# Patient Record
Sex: MALE | Race: WHITE | NOT HISPANIC OR LATINO
[De-identification: names, ages, dates, MRNs, and addresses within clinical notes are randomized per-mention and may not be internally consistent; named-entity substitution may affect disease eponyms.]

---

## 2017-09-01 ENCOUNTER — APPOINTMENT (OUTPATIENT)
Dept: NEPHROLOGY | Facility: CLINIC | Age: 82
End: 2017-09-01
Payer: MEDICARE

## 2017-09-01 VITALS — SYSTOLIC BLOOD PRESSURE: 94 MMHG | HEART RATE: 74 BPM | DIASTOLIC BLOOD PRESSURE: 64 MMHG

## 2017-09-01 DIAGNOSIS — Z87.440 PERSONAL HISTORY OF URINARY (TRACT) INFECTIONS: ICD-10-CM

## 2017-09-01 DIAGNOSIS — I95.9 HYPOTENSION, UNSPECIFIED: ICD-10-CM

## 2017-09-01 DIAGNOSIS — Z78.9 OTHER SPECIFIED HEALTH STATUS: ICD-10-CM

## 2017-09-01 PROCEDURE — 99215 OFFICE O/P EST HI 40 MIN: CPT

## 2017-09-01 RX ORDER — NITROGLYCERIN 0.4 MG/1
0.4 TABLET SUBLINGUAL
Qty: 25 | Refills: 0 | Status: ACTIVE | COMMUNITY
Start: 2017-06-22

## 2017-09-01 RX ORDER — SULFAMETHOXAZOLE AND TRIMETHOPRIM 800; 160 MG/1; MG/1
800-160 TABLET ORAL
Qty: 20 | Refills: 0 | Status: DISCONTINUED | COMMUNITY
Start: 2017-06-30

## 2017-09-01 RX ORDER — AMOXICILLIN AND CLAVULANATE POTASSIUM 875; 125 MG/1; MG/1
875-125 TABLET, COATED ORAL
Qty: 20 | Refills: 0 | Status: DISCONTINUED | COMMUNITY
Start: 2017-06-14

## 2017-09-05 LAB
APPEARANCE: CLEAR
BACTERIA: NEGATIVE
BILIRUBIN URINE: NEGATIVE
BLOOD URINE: NEGATIVE
COLOR: YELLOW
CREAT SPEC-SCNC: 33 MG/DL
CREAT/PROT UR: 0.2 RATIO
GLUCOSE QUALITATIVE U: NORMAL MG/DL
KETONES URINE: NEGATIVE
LEUKOCYTE ESTERASE URINE: NEGATIVE
MICROSCOPIC-UA: NORMAL
NITRITE URINE: NEGATIVE
PH URINE: 6
PROT UR-MCNC: 7 MG/DL
PROTEIN URINE: NEGATIVE MG/DL
RED BLOOD CELLS URINE: 1 /HPF
SPECIFIC GRAVITY URINE: 1.01
SQUAMOUS EPITHELIAL CELLS: 0 /HPF
UROBILINOGEN URINE: NORMAL MG/DL
WHITE BLOOD CELLS URINE: 0 /HPF

## 2017-12-15 ENCOUNTER — OUTPATIENT (OUTPATIENT)
Dept: OUTPATIENT SERVICES | Facility: HOSPITAL | Age: 82
LOS: 1 days | End: 2017-12-15
Payer: MEDICARE

## 2017-12-15 DIAGNOSIS — I25.10 ATHEROSCLEROTIC HEART DISEASE OF NATIVE CORONARY ARTERY WITHOUT ANGINA PECTORIS: ICD-10-CM

## 2017-12-15 PROCEDURE — 93010 ELECTROCARDIOGRAM REPORT: CPT

## 2017-12-15 PROCEDURE — 93005 ELECTROCARDIOGRAM TRACING: CPT

## 2017-12-28 ENCOUNTER — OUTPATIENT (OUTPATIENT)
Dept: OUTPATIENT SERVICES | Facility: HOSPITAL | Age: 82
LOS: 1 days | End: 2017-12-28
Payer: MEDICARE

## 2017-12-28 DIAGNOSIS — I25.10 ATHEROSCLEROTIC HEART DISEASE OF NATIVE CORONARY ARTERY WITHOUT ANGINA PECTORIS: ICD-10-CM

## 2017-12-28 PROCEDURE — 93306 TTE W/DOPPLER COMPLETE: CPT | Mod: 26

## 2017-12-28 PROCEDURE — 93306 TTE W/DOPPLER COMPLETE: CPT

## 2018-07-27 PROBLEM — Z78.9 ALCOHOL USE: Status: ACTIVE | Noted: 2017-09-01

## 2018-09-26 ENCOUNTER — OUTPATIENT (OUTPATIENT)
Dept: OUTPATIENT SERVICES | Facility: HOSPITAL | Age: 83
LOS: 1 days | End: 2018-09-26
Payer: MEDICARE

## 2018-09-26 DIAGNOSIS — I25.10 ATHEROSCLEROTIC HEART DISEASE OF NATIVE CORONARY ARTERY WITHOUT ANGINA PECTORIS: ICD-10-CM

## 2018-09-26 PROCEDURE — 93306 TTE W/DOPPLER COMPLETE: CPT | Mod: 26

## 2018-09-26 PROCEDURE — 93227 XTRNL ECG REC<48 HR R&I: CPT

## 2018-09-26 PROCEDURE — 93010 ELECTROCARDIOGRAM REPORT: CPT

## 2018-10-01 PROCEDURE — 93225 XTRNL ECG REC<48 HRS REC: CPT

## 2018-10-01 PROCEDURE — 93005 ELECTROCARDIOGRAM TRACING: CPT

## 2018-10-01 PROCEDURE — 93306 TTE W/DOPPLER COMPLETE: CPT

## 2019-04-16 ENCOUNTER — APPOINTMENT (OUTPATIENT)
Dept: NEPHROLOGY | Facility: CLINIC | Age: 84
End: 2019-04-16
Payer: MEDICARE

## 2019-04-16 VITALS — HEART RATE: 62 BPM | SYSTOLIC BLOOD PRESSURE: 136 MMHG | DIASTOLIC BLOOD PRESSURE: 70 MMHG

## 2019-04-16 PROCEDURE — 99214 OFFICE O/P EST MOD 30 MIN: CPT

## 2019-04-16 RX ORDER — CEPHALEXIN 500 MG/1
500 CAPSULE ORAL
Qty: 20 | Refills: 0 | Status: DISCONTINUED | COMMUNITY
Start: 2019-03-25

## 2019-04-16 RX ORDER — CEFUROXIME AXETIL 250 MG/1
250 TABLET ORAL
Qty: 20 | Refills: 0 | Status: DISCONTINUED | COMMUNITY
Start: 2018-12-13

## 2019-04-16 NOTE — PHYSICAL EXAM
[General Appearance - Alert] : alert [General Appearance - In No Acute Distress] : in no acute distress [Sclera] : the sclera and conjunctiva were normal [Extraocular Movements] : extraocular movements were intact [Examination Of The Oral Cavity] : the lips and gums were normal [Outer Ear] : the ears and nose were normal in appearance [Neck Appearance] : the appearance of the neck was normal [Jugular Venous Distention Increased] : there was no jugular-venous distention [Neck Cervical Mass (___cm)] : no neck mass was observed [Auscultation Breath Sounds / Voice Sounds] : lungs were clear to auscultation bilaterally [Heart Sounds] : normal S1 and S2 [Heart Rate And Rhythm] : heart rate was normal and rhythm regular [Heart Sounds Pericardial Friction Rub] : no pericardial rub [Murmurs] : no murmurs [Heart Sounds Gallop] : no gallops [Edema] : there was no peripheral edema [Abdomen Soft] : soft [Bowel Sounds] : normal bowel sounds [Abdomen Tenderness] : non-tender [Cervical Lymph Nodes Enlarged Anterior Bilaterally] : anterior cervical [Abdomen Mass (___ Cm)] : no abdominal mass palpated [Supraclavicular Lymph Nodes Enlarged Bilaterally] : supraclavicular [No CVA Tenderness] : no ~M costovertebral angle tenderness [No Spinal Tenderness] : no spinal tenderness [Abnormal Walk] : normal gait [No Focal Deficits] : no focal deficits [] : no rash [Impaired Insight] : insight and judgment were intact [Oriented To Time, Place, And Person] : oriented to person, place, and time [Affect] : the affect was normal

## 2019-04-17 LAB
APPEARANCE: CLEAR
BACTERIA: NEGATIVE
BILIRUBIN URINE: NEGATIVE
BLOOD URINE: NEGATIVE
COLOR: NORMAL
CREAT SPEC-SCNC: 67 MG/DL
CREAT/PROT UR: 0.2 RATIO
GLUCOSE QUALITATIVE U: NEGATIVE
HYALINE CASTS: 1 /LPF
KETONES URINE: NEGATIVE
LEUKOCYTE ESTERASE URINE: NEGATIVE
MICROSCOPIC-UA: NORMAL
NITRITE URINE: NEGATIVE
PH URINE: 6
PROT UR-MCNC: 14 MG/DL
PROTEIN URINE: NEGATIVE
RED BLOOD CELLS URINE: 1 /HPF
SPECIFIC GRAVITY URINE: 1.01
SQUAMOUS EPITHELIAL CELLS: 0 /HPF
UROBILINOGEN URINE: NORMAL
WHITE BLOOD CELLS URINE: 0 /HPF

## 2019-04-18 ENCOUNTER — TRANSCRIPTION ENCOUNTER (OUTPATIENT)
Age: 84
End: 2019-04-18

## 2019-04-19 LAB — BACTERIA UR CULT: NORMAL

## 2019-04-19 RX ORDER — METOPROLOL TARTRATE 25 MG/1
25 TABLET, FILM COATED ORAL
Qty: 180 | Refills: 0 | Status: DISCONTINUED | COMMUNITY
Start: 2017-08-13 | End: 2019-04-19

## 2019-04-19 NOTE — HISTORY OF PRESENT ILLNESS
[FreeTextEntry1] : 86 yo man  here for f/u evaluation of CKD 3, CAD, recurrent UTI last seen in office 9/2017\par Developed low back pain several weeks ago, while in [place] visiting his brother for last 3 months. No hematuria, dysuria or frothy urine.\par using medication for BPH- otherwise  no changes\par  No NSAID use.\par Denies flank pain dysuria or hematuria- \par \par

## 2019-04-19 NOTE — ASSESSMENT
[FreeTextEntry1] : all lab data was reviewed with patient in detail from 1/10/2019\par 86 yo man with CKD 3, proteinuria, hyperuricemia,CAD,  glucose intolerance and hyperuricemia.\par Also recently hospitalized for recurrent UTI\par --CKD 3-  creat stable range at 1.3; electrolytes good\par -hypotension- BP stabilized- on b-blocker for CAD- \par -hyperuricemia-  stable- c/w allopurinol- \par .-proteinuria-- normalized at .2 mg/g creat over last 2 determinations\par \par f/u 4 -6 months\par \par

## 2019-06-19 ENCOUNTER — OUTPATIENT (OUTPATIENT)
Dept: OUTPATIENT SERVICES | Facility: HOSPITAL | Age: 84
LOS: 1 days | End: 2019-06-19
Payer: MEDICARE

## 2019-06-19 ENCOUNTER — APPOINTMENT (OUTPATIENT)
Dept: INTERVENTIONAL RADIOLOGY/VASCULAR | Facility: HOSPITAL | Age: 84
End: 2019-06-19
Payer: MEDICARE

## 2019-06-19 PROCEDURE — 64483 NJX AA&/STRD TFRM EPI L/S 1: CPT

## 2019-06-19 PROCEDURE — 64483 NJX AA&/STRD TFRM EPI L/S 1: CPT | Mod: RT

## 2019-07-26 ENCOUNTER — OUTPATIENT (OUTPATIENT)
Dept: OUTPATIENT SERVICES | Facility: HOSPITAL | Age: 84
LOS: 1 days | End: 2019-07-26
Payer: MEDICARE

## 2019-07-26 ENCOUNTER — APPOINTMENT (OUTPATIENT)
Dept: INTERVENTIONAL RADIOLOGY/VASCULAR | Facility: HOSPITAL | Age: 84
End: 2019-07-26
Payer: MEDICARE

## 2019-07-26 PROCEDURE — 62323 NJX INTERLAMINAR LMBR/SAC: CPT

## 2019-08-27 ENCOUNTER — APPOINTMENT (OUTPATIENT)
Dept: NEPHROLOGY | Facility: CLINIC | Age: 84
End: 2019-08-27
Payer: MEDICARE

## 2019-08-27 VITALS — DIASTOLIC BLOOD PRESSURE: 68 MMHG | SYSTOLIC BLOOD PRESSURE: 121 MMHG | HEART RATE: 65 BPM

## 2019-08-27 PROCEDURE — 99214 OFFICE O/P EST MOD 30 MIN: CPT

## 2019-08-27 RX ORDER — METOPROLOL SUCCINATE 50 MG/1
50 TABLET, EXTENDED RELEASE ORAL
Qty: 90 | Refills: 0 | Status: DISCONTINUED | COMMUNITY
Start: 2019-02-19 | End: 2019-08-27

## 2019-08-27 NOTE — PHYSICAL EXAM
[General Appearance - Alert] : alert [Sclera] : the sclera and conjunctiva were normal [General Appearance - In No Acute Distress] : in no acute distress [Outer Ear] : the ears and nose were normal in appearance [Extraocular Movements] : extraocular movements were intact [Examination Of The Oral Cavity] : the lips and gums were normal [Neck Appearance] : the appearance of the neck was normal [Neck Cervical Mass (___cm)] : no neck mass was observed [Jugular Venous Distention Increased] : there was no jugular-venous distention [Auscultation Breath Sounds / Voice Sounds] : lungs were clear to auscultation bilaterally [Heart Sounds] : normal S1 and S2 [Heart Rate And Rhythm] : heart rate was normal and rhythm regular [Murmurs] : no murmurs [Heart Sounds Gallop] : no gallops [Heart Sounds Pericardial Friction Rub] : no pericardial rub [Edema] : there was no peripheral edema [Abdomen Mass (___ Cm)] : no abdominal mass palpated [Cervical Lymph Nodes Enlarged Anterior Bilaterally] : anterior cervical [Supraclavicular Lymph Nodes Enlarged Bilaterally] : supraclavicular [No CVA Tenderness] : no ~M costovertebral angle tenderness [No Spinal Tenderness] : no spinal tenderness [Abnormal Walk] : normal gait [No Focal Deficits] : no focal deficits [] : no rash [Affect] : the affect was normal [Oriented To Time, Place, And Person] : oriented to person, place, and time [Impaired Insight] : insight and judgment were intact

## 2019-08-27 NOTE — ASSESSMENT
[FreeTextEntry1] : all lab data was reviewed with patient in detail from 4/16/2019\par 86 yo man with CKD 3, proteinuria, hyperuricemia,CAD,  glucose intolerance and hyperuricemia.\par --CKD 3-  creat stable range; electrolytes good; given his above average muscle mass with measure cystatin-c for another means of estimating GFR\par wife asking about need for dietary limitations- does not need to modify diet at this tme- self restricting\par -hypotension- resolved  BP in good range; continues on b-blocker for CAD- \par -hyperuricemia-  stable-  no episodes of acute gout- c/w allopurinol- \par .-proteinuria-- normalized with prior determinations- for repeat data today\par \par f/u 4 -6 months\par \par

## 2019-08-27 NOTE — HISTORY OF PRESENT ILLNESS
[FreeTextEntry1] : 84 yo man with CKD 3, CAD, recurrent UTIs here for f/u evaluation.\par LBP improved a bit s/p 2 steroid injections last winter and again in spring- may go for a 3rd injection next month.\par No other new interim medical events\par No hematuria, dysuria or frothy urine.\par No NSAID use.\par Denies flank pain dysuria or hematuria- \par no recurrent CP; No SOB\par \par

## 2019-08-29 LAB
ALBUMIN SERPL ELPH-MCNC: 3.8 G/DL
ANION GAP SERPL CALC-SCNC: 12 MMOL/L
APPEARANCE: CLEAR
BACTERIA: NEGATIVE
BILIRUBIN URINE: NEGATIVE
BLOOD URINE: ABNORMAL
BUN SERPL-MCNC: 27 MG/DL
CALCIUM SERPL-MCNC: 9.1 MG/DL
CHLORIDE SERPL-SCNC: 104 MMOL/L
CO2 SERPL-SCNC: 25 MMOL/L
COLOR: NORMAL
CREAT SERPL-MCNC: 1.4 MG/DL
CREAT SPEC-SCNC: 85 MG/DL
CREAT/PROT UR: 0.1 RATIO
CYSTATIN C SERPL-MCNC: 1.44 MG/L
GFR/BSA.PRED SERPLBLD CYS-BASED-ARV: 43 ML/MIN
GLUCOSE QUALITATIVE U: NEGATIVE
GLUCOSE SERPL-MCNC: 160 MG/DL
HYALINE CASTS: 0 /LPF
KETONES URINE: NEGATIVE
LEUKOCYTE ESTERASE URINE: NEGATIVE
MICROSCOPIC-UA: NORMAL
NITRITE URINE: NEGATIVE
PH URINE: 6
PHOSPHATE SERPL-MCNC: 2.7 MG/DL
POTASSIUM SERPL-SCNC: 4.3 MMOL/L
PROT UR-MCNC: 8 MG/DL
PROTEIN URINE: NEGATIVE
RED BLOOD CELLS URINE: 24 /HPF
SODIUM SERPL-SCNC: 141 MMOL/L
SPECIFIC GRAVITY URINE: 1.01
SQUAMOUS EPITHELIAL CELLS: 0 /HPF
UROBILINOGEN URINE: NORMAL
WHITE BLOOD CELLS URINE: 1 /HPF

## 2019-09-17 ENCOUNTER — OUTPATIENT (OUTPATIENT)
Dept: OUTPATIENT SERVICES | Facility: HOSPITAL | Age: 84
LOS: 1 days | End: 2019-09-17
Payer: MEDICARE

## 2019-09-17 ENCOUNTER — APPOINTMENT (OUTPATIENT)
Dept: INTERVENTIONAL RADIOLOGY/VASCULAR | Facility: HOSPITAL | Age: 84
End: 2019-09-17
Payer: MEDICARE

## 2019-09-17 PROCEDURE — 62323 NJX INTERLAMINAR LMBR/SAC: CPT

## 2019-11-19 ENCOUNTER — EMERGENCY (EMERGENCY)
Facility: HOSPITAL | Age: 84
LOS: 1 days | Discharge: HOME | End: 2019-11-19
Attending: EMERGENCY MEDICINE | Admitting: EMERGENCY MEDICINE
Payer: MEDICARE

## 2019-11-19 VITALS
HEART RATE: 54 BPM | DIASTOLIC BLOOD PRESSURE: 70 MMHG | OXYGEN SATURATION: 95 % | TEMPERATURE: 98 F | SYSTOLIC BLOOD PRESSURE: 115 MMHG | RESPIRATION RATE: 16 BRPM

## 2019-11-19 VITALS
RESPIRATION RATE: 18 BRPM | HEART RATE: 86 BPM | DIASTOLIC BLOOD PRESSURE: 67 MMHG | TEMPERATURE: 98 F | SYSTOLIC BLOOD PRESSURE: 135 MMHG | WEIGHT: 154.98 LBS | HEIGHT: 66 IN | OXYGEN SATURATION: 97 %

## 2019-11-19 DIAGNOSIS — I10 ESSENTIAL (PRIMARY) HYPERTENSION: ICD-10-CM

## 2019-11-19 DIAGNOSIS — R07.89 OTHER CHEST PAIN: ICD-10-CM

## 2019-11-19 DIAGNOSIS — Z79.899 OTHER LONG TERM (CURRENT) DRUG THERAPY: ICD-10-CM

## 2019-11-19 LAB
ALBUMIN SERPL ELPH-MCNC: 4.1 G/DL — SIGNIFICANT CHANGE UP (ref 3.3–5)
ALP SERPL-CCNC: 68 U/L — SIGNIFICANT CHANGE UP (ref 40–120)
ALT FLD-CCNC: 34 U/L — SIGNIFICANT CHANGE UP (ref 10–45)
ANION GAP SERPL CALC-SCNC: 10 MMOL/L — SIGNIFICANT CHANGE UP (ref 5–17)
APTT BLD: 27.7 SEC — SIGNIFICANT CHANGE UP (ref 27.5–36.3)
AST SERPL-CCNC: 37 U/L — SIGNIFICANT CHANGE UP (ref 10–40)
BASOPHILS # BLD AUTO: 0.04 K/UL — SIGNIFICANT CHANGE UP (ref 0–0.2)
BASOPHILS NFR BLD AUTO: 0.6 % — SIGNIFICANT CHANGE UP (ref 0–2)
BILIRUB SERPL-MCNC: 0.5 MG/DL — SIGNIFICANT CHANGE UP (ref 0.2–1.2)
BUN SERPL-MCNC: 26 MG/DL — HIGH (ref 7–23)
CALCIUM SERPL-MCNC: 9.6 MG/DL — SIGNIFICANT CHANGE UP (ref 8.4–10.5)
CHLORIDE SERPL-SCNC: 105 MMOL/L — SIGNIFICANT CHANGE UP (ref 96–108)
CK MB CFR SERPL CALC: 2.8 NG/ML — SIGNIFICANT CHANGE UP (ref 0–6.7)
CO2 SERPL-SCNC: 26 MMOL/L — SIGNIFICANT CHANGE UP (ref 22–31)
CREAT SERPL-MCNC: 1.22 MG/DL — SIGNIFICANT CHANGE UP (ref 0.5–1.3)
EOSINOPHIL # BLD AUTO: 0.22 K/UL — SIGNIFICANT CHANGE UP (ref 0–0.5)
EOSINOPHIL NFR BLD AUTO: 3.3 % — SIGNIFICANT CHANGE UP (ref 0–6)
GLUCOSE SERPL-MCNC: 170 MG/DL — HIGH (ref 70–99)
HCT VFR BLD CALC: 41.7 % — SIGNIFICANT CHANGE UP (ref 39–50)
HGB BLD-MCNC: 13.4 G/DL — SIGNIFICANT CHANGE UP (ref 13–17)
IMM GRANULOCYTES NFR BLD AUTO: 0.1 % — SIGNIFICANT CHANGE UP (ref 0–1.5)
INR BLD: 1.05 — SIGNIFICANT CHANGE UP (ref 0.88–1.16)
LYMPHOCYTES # BLD AUTO: 1.08 K/UL — SIGNIFICANT CHANGE UP (ref 1–3.3)
LYMPHOCYTES # BLD AUTO: 16 % — SIGNIFICANT CHANGE UP (ref 13–44)
MCHC RBC-ENTMCNC: 30.3 PG — SIGNIFICANT CHANGE UP (ref 27–34)
MCHC RBC-ENTMCNC: 32.1 GM/DL — SIGNIFICANT CHANGE UP (ref 32–36)
MCV RBC AUTO: 94.3 FL — SIGNIFICANT CHANGE UP (ref 80–100)
MONOCYTES # BLD AUTO: 0.48 K/UL — SIGNIFICANT CHANGE UP (ref 0–0.9)
MONOCYTES NFR BLD AUTO: 7.1 % — SIGNIFICANT CHANGE UP (ref 2–14)
NEUTROPHILS # BLD AUTO: 4.9 K/UL — SIGNIFICANT CHANGE UP (ref 1.8–7.4)
NEUTROPHILS NFR BLD AUTO: 72.9 % — SIGNIFICANT CHANGE UP (ref 43–77)
NRBC # BLD: 0 /100 WBCS — SIGNIFICANT CHANGE UP (ref 0–0)
PLATELET # BLD AUTO: 167 K/UL — SIGNIFICANT CHANGE UP (ref 150–400)
POTASSIUM SERPL-MCNC: 4.3 MMOL/L — SIGNIFICANT CHANGE UP (ref 3.5–5.3)
POTASSIUM SERPL-SCNC: 4.3 MMOL/L — SIGNIFICANT CHANGE UP (ref 3.5–5.3)
PROT SERPL-MCNC: 6.5 G/DL — SIGNIFICANT CHANGE UP (ref 6–8.3)
PROTHROM AB SERPL-ACNC: 11.9 SEC — SIGNIFICANT CHANGE UP (ref 10–12.9)
RBC # BLD: 4.42 M/UL — SIGNIFICANT CHANGE UP (ref 4.2–5.8)
RBC # FLD: 13.1 % — SIGNIFICANT CHANGE UP (ref 10.3–14.5)
SODIUM SERPL-SCNC: 141 MMOL/L — SIGNIFICANT CHANGE UP (ref 135–145)
TROPONIN T SERPL-MCNC: 0.01 NG/ML — SIGNIFICANT CHANGE UP (ref 0–0.01)
TROPONIN T SERPL-MCNC: <0.01 NG/ML — SIGNIFICANT CHANGE UP (ref 0–0.01)
WBC # BLD: 6.73 K/UL — SIGNIFICANT CHANGE UP (ref 3.8–10.5)
WBC # FLD AUTO: 6.73 K/UL — SIGNIFICANT CHANGE UP (ref 3.8–10.5)

## 2019-11-19 PROCEDURE — 80053 COMPREHEN METABOLIC PANEL: CPT

## 2019-11-19 PROCEDURE — 85730 THROMBOPLASTIN TIME PARTIAL: CPT

## 2019-11-19 PROCEDURE — 36415 COLL VENOUS BLD VENIPUNCTURE: CPT

## 2019-11-19 PROCEDURE — 85025 COMPLETE CBC W/AUTO DIFF WBC: CPT

## 2019-11-19 PROCEDURE — 93005 ELECTROCARDIOGRAM TRACING: CPT

## 2019-11-19 PROCEDURE — 82553 CREATINE MB FRACTION: CPT

## 2019-11-19 PROCEDURE — 71045 X-RAY EXAM CHEST 1 VIEW: CPT

## 2019-11-19 PROCEDURE — 82550 ASSAY OF CK (CPK): CPT

## 2019-11-19 PROCEDURE — 93010 ELECTROCARDIOGRAM REPORT: CPT

## 2019-11-19 PROCEDURE — 84484 ASSAY OF TROPONIN QUANT: CPT

## 2019-11-19 PROCEDURE — 85610 PROTHROMBIN TIME: CPT

## 2019-11-19 PROCEDURE — 99283 EMERGENCY DEPT VISIT LOW MDM: CPT | Mod: 25

## 2019-11-19 PROCEDURE — 99285 EMERGENCY DEPT VISIT HI MDM: CPT

## 2019-11-19 PROCEDURE — 71045 X-RAY EXAM CHEST 1 VIEW: CPT | Mod: 26

## 2019-11-19 NOTE — ED PROVIDER NOTE - PATIENT PORTAL LINK FT
You can access the FollowMyHealth Patient Portal offered by [facility] by registering at the following website: [email or web address]. By joining [facility]’s FollowMyHealth portal, you will also be able to view your health information using other applications (apps) compatible with our system.

## 2019-11-19 NOTE — ED ADULT NURSE NOTE - PMH
Acute myocardial infarction  Myocardial infarction  Atherosclerosis of coronary artery  CAD (coronary artery disease)  Benign prostatic hypertrophy without lower urinary tract symptoms  BPH (benign prostatic hyperplasia)  Calculus of kidney  Kidney stone  Essential hypertension  HTN (hypertension)

## 2019-11-19 NOTE — ED ADULT TRIAGE NOTE - CHIEF COMPLAINT QUOTE
pt c/o intermittent left sided chest pain, radiating to the shoulder since Thursday, denies SOB or pain at this time. ekg done.

## 2019-11-19 NOTE — ED ADULT NURSE NOTE - OBJECTIVE STATEMENT
Patient presents to the ED with intermittent left sided chest pain radiating to the left shoulder for about a week.  denies diaphoresis, SOB.  AA&OX3, respirations unlabored, non-diaphoretic, no pallor.  Well appearing.

## 2019-11-19 NOTE — ED PROVIDER NOTE - OBJECTIVE STATEMENT
84 y/o male with hx of CAD, HTH, BPH c/o cp x 3 days. pt states intermittent pain for past 3 days to left chest and radiates to left arm. no sob or FLORES. pt notes pain worse last night. pt reports short episode of discomfort this am. no ha or dizziness. no abd pain, n/v. no neck or back pain. no leg pain or swelling. no further complaints.

## 2019-11-19 NOTE — ED PROVIDER NOTE - NS ED MD EM SELECTION
Injectable Influenza Immunization Documentation    1.  Is the person to be vaccinated sick today?  No    2. Does the person to be vaccinated have an allergy to eggs or to a component of the vaccine?  No    3. Has the person to be vaccinated today ever had a serious reaction to influenza vaccine in the past?  No    4. Has the person to be vaccinated ever had Guillain-Newry syndrome?  No     Form completed by Courtney Balderrama CMA - Pediatrics    Prior to injection verified patient identity using patient's name and date of birth. Patient instructed to remain in clinic for 20 minutes afterwards, and to report any adverse reaction to me immediately.    Courtney Balderrama CMA - Pediatrics       [id number] [facility]

## 2019-11-19 NOTE — ED ADULT TRIAGE NOTE - WEIGHT IN KG
"Received call from patients mom Valentino, stating that patient stopped taking her losartan 2 weeks ago and her BP has been \"wonderful\". -120/60-80. Valentino reports that she cancelled Paulo's appointment with Dr. Alexis for next Wednesday. Writer tried to call back, left VM. Will try again on Monday.  Christal Morales LPN  Nephrology  [phone]    "
Spoke to patient, who reports that she is feeling good, BP good off the losartan and they cancelled appointment for this week with Dr. Baig.  Offered to discuss with patients mom if she wants and encouraged patient and or mom to call with any questions or concerns in the future.   Christal Morales LPN  Nephrology  [phone]    
70.3

## 2019-11-19 NOTE — ED PROVIDER NOTE - PROGRESS NOTE DETAILS
Shannan: received s/o pending repeat trop. repeat trop wnl, feeling much better, comfortable for dc, outpt f/u.

## 2019-11-19 NOTE — ED PROVIDER NOTE - CARE PROVIDER_API CALL
Marlon Nice (MD)  Cardiovascular Disease  [address] [place] [address], [address] [place] [address]  Phone: [phone]  Fax: [phone]  Follow Up Time: 1-3 Days

## 2019-11-19 NOTE — ED PROVIDER NOTE - ATTENDING CONTRIBUTION TO CARE
85M cad, htn, bph, c/o 3d intermittent sharp L cp radiating to L armpit. no fever/chills, no dizziness, no uri/cough, no sob/wheezing, no abd pain/n/v, no leg pain/swelling/rash, no rash, no trauma. avss. nontoxic. NAD. no active cp. no acute resp distress. no leukocytosis vs sig anemia vs electrolyte abnl. trop neg x1, trop#2 pending. ekg w/o acute abnl. heart score 3. cxr w/o acute focal consol vs ptx vs pulm edema. s/o'd to dr dietz pending trop#2.    I saw and discussed the care of the pt directly with the ACP while the pt was in the ED. i have reviewed the ACP note and agree w/ the history, exam and plan of care other than as noted above.

## 2020-01-21 ENCOUNTER — APPOINTMENT (OUTPATIENT)
Dept: NEPHROLOGY | Facility: CLINIC | Age: 85
End: 2020-01-21

## 2020-10-07 ENCOUNTER — APPOINTMENT (OUTPATIENT)
Dept: OTOLARYNGOLOGY | Facility: CLINIC | Age: 85
End: 2020-10-07
Payer: MEDICARE

## 2020-10-07 VITALS — TEMPERATURE: 98.7 F | WEIGHT: 157 LBS | BODY MASS INDEX: 25.23 KG/M2 | HEIGHT: 66 IN

## 2020-10-07 DIAGNOSIS — Z78.9 OTHER SPECIFIED HEALTH STATUS: ICD-10-CM

## 2020-10-07 DIAGNOSIS — Z82.5 FAMILY HISTORY OF ASTHMA AND OTHER CHRONIC LOWER RESPIRATORY DISEASES: ICD-10-CM

## 2020-10-07 DIAGNOSIS — J31.0 CHRONIC RHINITIS: ICD-10-CM

## 2020-10-07 DIAGNOSIS — Z83.3 FAMILY HISTORY OF DIABETES MELLITUS: ICD-10-CM

## 2020-10-07 PROCEDURE — 99213 OFFICE O/P EST LOW 20 MIN: CPT | Mod: 25

## 2020-10-07 PROCEDURE — 69210 REMOVE IMPACTED EAR WAX UNI: CPT

## 2020-10-07 NOTE — ASSESSMENT
[FreeTextEntry1] : AMBREEN BRITTANEY is doing well. i suggested holding off with Flonase and using Vaseline on caudal septum in am.

## 2020-10-07 NOTE — HISTORY OF PRESENT ILLNESS
[de-identified] : AMBREEN QUISPE is a 86 year man with a history of CAD who complains of dry nose. he uses Flonase periodically.

## 2020-11-12 ENCOUNTER — APPOINTMENT (OUTPATIENT)
Dept: NEPHROLOGY | Facility: CLINIC | Age: 85
End: 2020-11-12
Payer: MEDICARE

## 2020-11-12 DIAGNOSIS — Z23 ENCOUNTER FOR IMMUNIZATION: ICD-10-CM

## 2020-11-12 PROCEDURE — 99214 OFFICE O/P EST MOD 30 MIN: CPT | Mod: 25

## 2020-11-12 PROCEDURE — G0008: CPT

## 2020-11-12 PROCEDURE — 90662 IIV NO PRSV INCREASED AG IM: CPT | Mod: 59

## 2020-11-12 NOTE — ASSESSMENT
[FreeTextEntry1] : all lab data was reviewed with patient in detail from 11/5/2020\par 87 yo man with CKD 3, proteinuria, hyperuricemia,CAD,  glucose intolerance and hyperuricemia.\par --CKD 3-  BUN/creat good 37/1.24; electrolytes WNL; limiting protein and avoiding NSAIDs\par -glucose intolerance-  glucose 129- A1C 6.8%- counseled on need to reduce CHO intake, make an effort to walk at least 3 x daily\par and lose weight. May need to start antihyperglycemic meds if no improvement by next OV\par -hyperuricemia-  UA very good- 4.5- no episodes of acute gout- c/w allopurinol- \par .-proteinuria-- low grade  286-  No RAAS at this time as BP on low side and needs BB for cardiac hx. \par -flu shot today\par \par f/u 6 months\par \par

## 2020-11-12 NOTE — HISTORY OF PRESENT ILLNESS
[FreeTextEntry1] : 87 yo man here for f/u evaluation of CKD 3, CAD, recurrent UTIs.\par coping with COVID restrictions. No new medical events and no change to meds\par LBP stable- does feel that he is stiff every morning and after sitting for a while has pain\par No hematuria, dysuria or frothy urine.\par No NSAID use.\par Denies flank pain dysuria or hematuria- \par no recurrent CP; No SOB\par \par

## 2020-11-12 NOTE — PHYSICAL EXAM
[General Appearance - Alert] : alert [General Appearance - In No Acute Distress] : in no acute distress [Sclera] : the sclera and conjunctiva were normal [Extraocular Movements] : extraocular movements were intact [Outer Ear] : the ears and nose were normal in appearance [Examination Of The Oral Cavity] : the lips and gums were normal [Neck Appearance] : the appearance of the neck was normal [Neck Cervical Mass (___cm)] : no neck mass was observed [Jugular Venous Distention Increased] : there was no jugular-venous distention [Auscultation Breath Sounds / Voice Sounds] : lungs were clear to auscultation bilaterally [Heart Rate And Rhythm] : heart rate was normal and rhythm regular [Heart Sounds] : normal S1 and S2 [Heart Sounds Gallop] : no gallops [Murmurs] : no murmurs [Heart Sounds Pericardial Friction Rub] : no pericardial rub [Edema] : there was no peripheral edema [Cervical Lymph Nodes Enlarged Anterior Bilaterally] : anterior cervical [Supraclavicular Lymph Nodes Enlarged Bilaterally] : supraclavicular [No CVA Tenderness] : no ~M costovertebral angle tenderness [No Spinal Tenderness] : no spinal tenderness [Abnormal Walk] : normal gait [] : no rash [No Focal Deficits] : no focal deficits [Oriented To Time, Place, And Person] : oriented to person, place, and time [Impaired Insight] : insight and judgment were intact [Affect] : the affect was normal

## 2020-12-03 ENCOUNTER — OUTPATIENT (OUTPATIENT)
Dept: OUTPATIENT SERVICES | Facility: HOSPITAL | Age: 85
LOS: 1 days | End: 2020-12-03
Payer: MEDICARE

## 2020-12-03 DIAGNOSIS — I25.9 CHRONIC ISCHEMIC HEART DISEASE, UNSPECIFIED: ICD-10-CM

## 2020-12-03 PROCEDURE — 93010 ELECTROCARDIOGRAM REPORT: CPT

## 2020-12-03 PROCEDURE — 93005 ELECTROCARDIOGRAM TRACING: CPT

## 2020-12-03 PROCEDURE — 93306 TTE W/DOPPLER COMPLETE: CPT

## 2020-12-03 PROCEDURE — 93306 TTE W/DOPPLER COMPLETE: CPT | Mod: 26

## 2020-12-04 DIAGNOSIS — J32.9 CHRONIC SINUSITIS, UNSPECIFIED: ICD-10-CM

## 2021-05-13 ENCOUNTER — APPOINTMENT (OUTPATIENT)
Dept: NEPHROLOGY | Facility: CLINIC | Age: 86
End: 2021-05-13
Payer: MEDICARE

## 2021-05-13 VITALS — DIASTOLIC BLOOD PRESSURE: 70 MMHG | SYSTOLIC BLOOD PRESSURE: 126 MMHG | HEART RATE: 66 BPM

## 2021-05-13 DIAGNOSIS — Z00.00 ENCOUNTER FOR GENERAL ADULT MEDICAL EXAMINATION W/OUT ABNORMAL FINDINGS: ICD-10-CM

## 2021-05-13 PROCEDURE — 99214 OFFICE O/P EST MOD 30 MIN: CPT

## 2021-05-13 NOTE — PHYSICAL EXAM
[General Appearance - Alert] : alert [General Appearance - In No Acute Distress] : in no acute distress [] : no respiratory distress [Auscultation Breath Sounds / Voice Sounds] : lungs were clear to auscultation bilaterally [Heart Rate And Rhythm] : heart rate was normal and rhythm regular [Heart Sounds] : normal S1 and S2 [Heart Sounds Gallop] : no gallops [Murmurs] : no murmurs [Heart Sounds Pericardial Friction Rub] : no pericardial rub [Edema] : there was no peripheral edema [Oriented To Time, Place, And Person] : oriented to person, place, and time [Impaired Insight] : insight and judgment were intact [Affect] : the affect was normal

## 2021-05-14 LAB
COVID-19 NUCLEOCAPSID  GAM ANTIBODY INTERPRETATION: NEGATIVE
COVID-19 SPIKE DOMAIN ANTIBODY INTERPRETATION: POSITIVE
SARS-COV-2 AB SERPL IA-ACNC: 3.2 U/ML
SARS-COV-2 AB SERPL QL IA: 0.09 INDEX
SARS-COV-2 N GENE NPH QL NAA+PROBE: NOT DETECTED

## 2021-05-14 NOTE — HISTORY OF PRESENT ILLNESS
[FreeTextEntry1] : 88 yo man with CKD 3, CAD, recurrent UTIs, here for follow up evaluation.\par recurrent right LBP with some radiation down right thigh\par had received 1st COVID vaccine- but missed out on 2nd back in Feb.\par restarted his stretching and his exercise\par No hematuria, dysuria or frothy urine.\par No NSAID use.\par Denies flank pain dysuria or hematuria- \par no recurrent CP; No SOB\par \par

## 2021-05-14 NOTE — ASSESSMENT
[FreeTextEntry1] : all lab data was reviewed with patient in detail from 5/11/2021 and 5/13/2021\par 86 yo man with CKD 3, proteinuria, hyperuricemia,CAD,  glucose intolerance and hyperuricemia.\par --CKD 3-  BUN/creat stable 29/1.15 Na, K okay; limiting protein and avoiding NSAIDs\par -glucose intolerance- up trend in  A1C  7.0%-  detailed discussion on need to reduce CHO intake- resumption of exercise will be beneficial. If A1c continues to rise, will need to ass antihyperglycemic agent. \par -hyperuricemia-  UA  excellent 3.9 - c/w allopurinol- \par .-proteinuria-- low grade 263  No RAAS  as episodes of hypotension in past \par -hemturia- this u/a without blood. monitor\par -COVID- does have some Aurelio Ab- may still benefit from 2nd vaccine, despite delay- will review with ID\par \par f/u 6 months\par \par

## 2021-06-25 ENCOUNTER — RX RENEWAL (OUTPATIENT)
Age: 86
End: 2021-06-25

## 2021-07-26 ENCOUNTER — RX RENEWAL (OUTPATIENT)
Age: 86
End: 2021-07-26

## 2021-08-02 ENCOUNTER — APPOINTMENT (OUTPATIENT)
Dept: UROLOGY | Facility: CLINIC | Age: 86
End: 2021-08-02
Payer: MEDICARE

## 2021-08-02 VITALS
BODY MASS INDEX: 25.23 KG/M2 | HEIGHT: 66 IN | TEMPERATURE: 97.88 F | SYSTOLIC BLOOD PRESSURE: 157 MMHG | HEART RATE: 62 BPM | WEIGHT: 157 LBS | DIASTOLIC BLOOD PRESSURE: 74 MMHG

## 2021-08-02 DIAGNOSIS — N40.0 BENIGN PROSTATIC HYPERPLASIA WITHOUT LOWER URINARY TRACT SYMPMS: ICD-10-CM

## 2021-08-02 PROCEDURE — 99204 OFFICE O/P NEW MOD 45 MIN: CPT

## 2021-08-02 RX ORDER — AZITHROMYCIN 250 MG/1
250 TABLET, FILM COATED ORAL
Qty: 1 | Refills: 2 | Status: COMPLETED | COMMUNITY
Start: 2020-12-04 | End: 2021-08-02

## 2021-08-02 NOTE — PHYSICAL EXAM
[General Appearance - Well Developed] : well developed [General Appearance - Well Nourished] : well nourished [Edema] : no peripheral edema [] : no respiratory distress [Abdomen Soft] : soft [Urethral Meatus] : meatus normal [Penis Abnormality] : normal uncircumcised penis [Testes Tenderness] : no tenderness of the testes [Testes Mass (___cm)] : there were no testicular masses [Prostate Tenderness] : the prostate was not tender [FreeTextEntry1] : prostate is hard with a nodule [Normal Station and Gait] : the gait and station were normal for the patient's age [Skin Color & Pigmentation] : normal skin color and pigmentation [No Focal Deficits] : no focal deficits [Oriented To Time, Place, And Person] : oriented to person, place, and time [Not Anxious] : not anxious

## 2021-08-02 NOTE — ASSESSMENT
[FreeTextEntry1] : 87 year old  with worsening complaints of urinary issues.  Slow flow, nocturia x 3 - no infections and no gross hematuria. no/hx SHAYAN.\par \par 1. BPH - Post Void Residual 1 cc\par     flomax and finasteride\par 2. Microscopic Hematuria (2019) - UA Ucx\par 3. THEE _ nodule - this is not prostate cancer screening due to the patient has outlet complaints - MRI to assess anatomy. Cr 1.3\par 4. Milk the urethra (especially at night)\par 5. voiding diary \par \par Thank you very much for allowing me to assist in the care of this patient. Should you have any additional questions or concerns please do not hesitate to contact me.\par \par \par Sincerely,\par \par \par Nelson Dominguez D.O.\par  of Urology and Radiology\par  of Urology at [facility]\par System Director for Prostate Cancer\par [address] [place] [address], 5th Floor [facility], [facility]\par Phone: [phone]\par

## 2021-08-02 NOTE — HISTORY OF PRESENT ILLNESS
[FreeTextEntry1] : Dear Dr. Yee Abdullahi (Nephrology - 5th floor [facility])\par \par Thank you so much for the referral to help care for your patient.\par \par Chief Complaint: Prostate issues\par Date of first visit: 08/02/2021\par \par Dakota Almonte is a 87  year old  gentleman with PMHx CKD3, microscopic hematuria, proteinuria, hyperuricemia, gout, CAD who presents for prostate issues.  He voids 3 times during the day.  He also has nocturia x 3.  no hx of SHAYAN.  nocturia feels like full voids.  The patient denies UTI.  No Gross Hematuria.\par \par UA on 8/29/2019- 24 RBC/hpf\par \par proteinuria, hyperuricemia, CKD3 managed by Dr Abdullahi\par Cr 1.3 2/4/21, GFR 49\par \par 08/02/2021 \par IPSS NR QOL NR \par ARSENIO - NSA\par \par UroFlow\par PVR 1 cc\par 98 cc volume, 3.4 ml/s \par \par The patient denies fevers, chills, nausea and or vomiting and no unexplained weight loss.\par \par All pertinent laboratory, films and physician notes were reviewed. Questionnaire results were discussed with patient.

## 2021-08-04 ENCOUNTER — NON-APPOINTMENT (OUTPATIENT)
Age: 86
End: 2021-08-04

## 2021-08-04 LAB
ANION GAP SERPL CALC-SCNC: 10 MMOL/L
APPEARANCE: CLEAR
BACTERIA UR CULT: NORMAL
BACTERIA: NEGATIVE
BILIRUBIN URINE: NEGATIVE
BLOOD URINE: NEGATIVE
BUN SERPL-MCNC: 32 MG/DL
CALCIUM SERPL-MCNC: 9.7 MG/DL
CHLORIDE SERPL-SCNC: 104 MMOL/L
CO2 SERPL-SCNC: 25 MMOL/L
COLOR: YELLOW
CREAT SERPL-MCNC: 1.33 MG/DL
GLUCOSE QUALITATIVE U: NEGATIVE
GLUCOSE SERPL-MCNC: 149 MG/DL
HYALINE CASTS: 1 /LPF
KETONES URINE: NEGATIVE
LEUKOCYTE ESTERASE URINE: NEGATIVE
MICROSCOPIC-UA: NORMAL
NITRITE URINE: NEGATIVE
PH URINE: 6
POTASSIUM SERPL-SCNC: 4.4 MMOL/L
PROTEIN URINE: NORMAL
PSA FREE FLD-MCNC: 38 %
PSA FREE SERPL-MCNC: 0.05 NG/ML
PSA SERPL-MCNC: 0.13 NG/ML
RED BLOOD CELLS URINE: 1 /HPF
SODIUM SERPL-SCNC: 139 MMOL/L
SPECIFIC GRAVITY URINE: 1.01
SQUAMOUS EPITHELIAL CELLS: 1 /HPF
UROBILINOGEN URINE: NORMAL
WHITE BLOOD CELLS URINE: 0 /HPF

## 2021-08-20 ENCOUNTER — NON-APPOINTMENT (OUTPATIENT)
Age: 86
End: 2021-08-20

## 2021-08-23 ENCOUNTER — APPOINTMENT (OUTPATIENT)
Dept: UROLOGY | Facility: CLINIC | Age: 86
End: 2021-08-23

## 2021-08-25 ENCOUNTER — TRANSCRIPTION ENCOUNTER (OUTPATIENT)
Age: 86
End: 2021-08-25

## 2021-09-13 ENCOUNTER — RX RENEWAL (OUTPATIENT)
Age: 86
End: 2021-09-13

## 2021-09-29 ENCOUNTER — APPOINTMENT (OUTPATIENT)
Dept: UROLOGY | Facility: CLINIC | Age: 86
End: 2021-09-29

## 2021-10-13 ENCOUNTER — APPOINTMENT (OUTPATIENT)
Dept: UROLOGY | Facility: CLINIC | Age: 86
End: 2021-10-13
Payer: MEDICARE

## 2021-10-13 VITALS
TEMPERATURE: 97.7 F | HEART RATE: 75 BPM | SYSTOLIC BLOOD PRESSURE: 145 MMHG | BODY MASS INDEX: 25.23 KG/M2 | WEIGHT: 157 LBS | DIASTOLIC BLOOD PRESSURE: 85 MMHG | HEIGHT: 66 IN

## 2021-10-13 DIAGNOSIS — N40.2 NODULAR PROSTATE W/OUT LOWER URINARY TRACT SYMPTOMS: ICD-10-CM

## 2021-10-13 PROCEDURE — 99214 OFFICE O/P EST MOD 30 MIN: CPT

## 2021-10-13 NOTE — ASSESSMENT
[FreeTextEntry1] : 87 year old  with worsening complaints of urinary issues.  Slow flow, nocturia x 4, large volume voids - no infections and no gross hematuria. No hx SHAYAN.\par \par 1. BPH - Continue flomax and finasteride. PVR 156cc \par 2. UA, UCx\par 3. Microscopic Hematuria (2019) - repeat UA Ucx negative 8/2/21\par 4. MRI at [facility]-THEE with nodule - this is not prostate cancer screening due to the patient has outlet complaints - need to assess anatomy \par 5. Milk the urethra (especially at night)\par 6. Given voiding diary \par \par \par Thank you very much for allowing me to assist in the care of this patient. Should you have any additional questions or concerns please do not hesitate to contact me.\par \par \par Sincerely,\par \par \par Nelson Dominguez D.O.\par  of Urology and Radiology\par  of Urology at [facility]\par System Director for Prostate Cancer\par [address], 5th Floor [facility], [facility] [place]\par Phone: [phone]\par

## 2021-10-13 NOTE — HISTORY OF PRESENT ILLNESS
[FreeTextEntry1] : Dear Dr. Yee Abdullahi (Nephrology - 5th floor [facility])\par \par Thank you so much for the referral to help care for your patient.\par \par Chief Complaint: BPH\par Date of first visit: 08/02/2021\par \par Dakota Almonte is a 87 year old  gentleman with PMHx CKD3, microscopic hematuria, proteinuria, hyperuricemia, gout, CAD who presents for BPH. His biggest complaint is urinary frequency and nocturia x4. States these are large volume voids. No hx of SHAYAN however he did not fill out his voiding diary which he was given at last office visit. MRI is pending to assess anatomy. He does have a + THEE on exam 8/2/21 however this was ordered for outlet complaints not for cancer screening. He could not get done because he had to go to [place]. He seems confused.\par \par 8/29/2019 UA- 24 RBC/hpf. No work up\par 8/2/21 repeat UA-1 RBC/hpf\par \par Proteinuria, hyperuricemia, CKD3 managed by Dr Abdullahi\par 8/2/21 Cr 1.33-stable\par \par 10/13/2021\par IPSS 10 QOL 4\par ARSENIO-NSA\par Flow/PVR: cannot give flow, \par \par 08/02/2021 \par IPSS NR QOL NR \par ARSENIO - NSA\par \par UroFlow\par PVR 1 cc\par 98 cc volume, 3.4 ml/s \par \par The patient denies fevers, chills, nausea and or vomiting and no unexplained weight loss.\par \par All pertinent laboratory, films and physician notes were reviewed. Questionnaire results were discussed with patient.

## 2021-10-13 NOTE — PHYSICAL EXAM
[General Appearance - Well Developed] : well developed [General Appearance - Well Nourished] : well nourished [Abdomen Soft] : soft [Not Anxious] : not anxious [Oriented To Time, Place, And Person] : oriented to person, place, and time [Normal Station and Gait] : the gait and station were normal for the patient's age [No Focal Deficits] : no focal deficits [Normal Appearance] : normal appearance [Well Groomed] : well groomed [General Appearance - In No Acute Distress] : no acute distress [Abdomen Tenderness] : non-tender [Costovertebral Angle Tenderness] : no ~M costovertebral angle tenderness [Urinary Bladder Findings] : the bladder was normal on palpation [Respiration, Rhythm And Depth] : normal respiratory rhythm and effort [Exaggerated Use Of Accessory Muscles For Inspiration] : no accessory muscle use [Affect] : the affect was normal [Mood] : the mood was normal [] : no hepato-splenomegaly [Edema] : no peripheral edema [FreeTextEntry1] : prostate is hard with a nodule 8/2/21

## 2021-10-14 LAB
APPEARANCE: CLEAR
BACTERIA: NEGATIVE
BILIRUBIN URINE: NEGATIVE
BLOOD URINE: NEGATIVE
COLOR: NORMAL
GLUCOSE QUALITATIVE U: NEGATIVE
HYALINE CASTS: 0 /LPF
KETONES URINE: NEGATIVE
LEUKOCYTE ESTERASE URINE: NEGATIVE
MICROSCOPIC-UA: NORMAL
NITRITE URINE: NEGATIVE
PH URINE: 6.5
PROTEIN URINE: NEGATIVE
RED BLOOD CELLS URINE: 0 /HPF
SPECIFIC GRAVITY URINE: 1.01
SQUAMOUS EPITHELIAL CELLS: 0 /HPF
UROBILINOGEN URINE: NORMAL
WHITE BLOOD CELLS URINE: 0 /HPF

## 2021-10-15 ENCOUNTER — NON-APPOINTMENT (OUTPATIENT)
Age: 86
End: 2021-10-15

## 2021-10-15 LAB — BACTERIA UR CULT: NORMAL

## 2021-10-19 ENCOUNTER — TRANSCRIPTION ENCOUNTER (OUTPATIENT)
Age: 86
End: 2021-10-19

## 2021-11-01 ENCOUNTER — TRANSCRIPTION ENCOUNTER (OUTPATIENT)
Age: 86
End: 2021-11-01

## 2021-11-05 ENCOUNTER — TRANSCRIPTION ENCOUNTER (OUTPATIENT)
Age: 86
End: 2021-11-05

## 2021-11-08 ENCOUNTER — APPOINTMENT (OUTPATIENT)
Dept: UROLOGY | Facility: CLINIC | Age: 86
End: 2021-11-08

## 2021-11-10 ENCOUNTER — NON-APPOINTMENT (OUTPATIENT)
Age: 86
End: 2021-11-10

## 2022-01-24 ENCOUNTER — RX RENEWAL (OUTPATIENT)
Age: 87
End: 2022-01-24

## 2022-05-17 ENCOUNTER — APPOINTMENT (OUTPATIENT)
Dept: NEPHROLOGY | Facility: CLINIC | Age: 87
End: 2022-05-17
Payer: MEDICARE

## 2022-05-17 VITALS — DIASTOLIC BLOOD PRESSURE: 74 MMHG | SYSTOLIC BLOOD PRESSURE: 134 MMHG | HEART RATE: 72 BPM

## 2022-05-17 DIAGNOSIS — R35.0 FREQUENCY OF MICTURITION: ICD-10-CM

## 2022-05-17 PROCEDURE — 99214 OFFICE O/P EST MOD 30 MIN: CPT

## 2022-05-17 NOTE — HISTORY OF PRESENT ILLNESS
[FreeTextEntry1] : 89 yo man here for f/u evaluation of CKD 3, CAD, recurrent UTIs.\par being evaluated for LUTS- BPH\par did get 2nd dose of COVID vaccine, but wife thinks that they contracted COVID when traveling to [place] last Dec.- mild symptoms- is not boosted- suggested that proceed with booster\par sciatica reasonable controlled, but does limit his walking\par has lost some weight\par No NSAID use.\par Denies flank pain dysuria or hematuria- \par no CP; no SOB\par \par

## 2022-05-17 NOTE — ASSESSMENT
[FreeTextEntry1] : all lab data was reviewed with patient in detail from 5/12/2022\par 87 yo man with CKD 3, proteinuria, hyperuricemia,CAD,  glucose intolerance and hyperuricemia.\par --CKD 3-  BUN/creat 26/1.35- mild uptrend creat Na, K okay; limiting protein and avoiding NSAIDs\par -glucose intolerance- A1C down to 6.7% from 7.0%-   encouragement provided- advised that goal is < 6.5%-  \par reviewed low CHO diet- does consume a fair amount of bread and pasta- need to reduce portion size\par also offered that better glycemic control may also help reduce Ufrequency \par -hyperuricemia-  UA  controlled - c/w allopurinol- \par .-proteinuria-- low grade stable 224  No RAAS  as episodes of hypotension in past \par -hemturia- resolved\par -COVID- advised to get booster (3rd shot)\par \par f/u 6 months\par \par

## 2022-05-19 ENCOUNTER — APPOINTMENT (OUTPATIENT)
Dept: UROLOGY | Facility: CLINIC | Age: 87
End: 2022-05-19
Payer: MEDICARE

## 2022-05-19 VITALS
TEMPERATURE: 97.8 F | DIASTOLIC BLOOD PRESSURE: 79 MMHG | BODY MASS INDEX: 24.75 KG/M2 | SYSTOLIC BLOOD PRESSURE: 153 MMHG | WEIGHT: 154 LBS | HEART RATE: 63 BPM | HEIGHT: 66 IN

## 2022-05-19 LAB
BILIRUB UR QL STRIP: NORMAL
CLARITY UR: CLEAR
COLLECTION METHOD: NORMAL
GLUCOSE UR-MCNC: NORMAL
HCG UR QL: 0.2 EU/DL
HGB UR QL STRIP.AUTO: NORMAL
KETONES UR-MCNC: NORMAL
LEUKOCYTE ESTERASE UR QL STRIP: NORMAL
NITRITE UR QL STRIP: NORMAL
PH UR STRIP: 6.5
PROT UR STRIP-MCNC: NORMAL
SP GR UR STRIP: 1.01

## 2022-05-19 PROCEDURE — 99214 OFFICE O/P EST MOD 30 MIN: CPT

## 2022-05-19 PROCEDURE — 51798 US URINE CAPACITY MEASURE: CPT

## 2022-05-19 PROCEDURE — 81003 URINALYSIS AUTO W/O SCOPE: CPT | Mod: QW

## 2022-05-20 NOTE — PHYSICAL EXAM
[General Appearance - Well Developed] : well developed [Normal Appearance] : normal appearance [Heart Rate And Rhythm] : Heart rate and rhythm were normal [] : no respiratory distress [Abdomen Soft] : soft [Abdomen Tenderness] : non-tender [Costovertebral Angle Tenderness] : no ~M costovertebral angle tenderness [Urethral Meatus] : meatus normal [Penis Abnormality] : normal circumcised penis [Testes Tenderness] : no tenderness of the testes [Testes Mass (___cm)] : there were no testicular masses [Normal Station and Gait] : the gait and station were normal for the patient's age [Skin Color & Pigmentation] : normal skin color and pigmentation [No Focal Deficits] : no focal deficits [Oriented To Time, Place, And Person] : oriented to person, place, and time [FreeTextEntry1] : left epididymal cyst with tenderness on exam, prostate is small on THEE with no obvious nodule on exam today

## 2022-05-20 NOTE — HISTORY OF PRESENT ILLNESS
[FreeTextEntry1] : 89 yo male with hx of LUTS.  He is most bothered by nocturia 2-3 times at night.  He also notes a sensation of incomplete emptying and occasional urgency.  He has a moderate stream.  He denies hematuria, dysuria, or incontinence.  He does not occasional urgency.  He does note a hx of constipation which he treats with stool softeners as needed. He currently does not feel constipated.  He denies excessive caffeine or fluid intake.  But does note that he drinks water at night before bedtime.  He has been on finasteride and tamsulosin for many years. \par \par PVR = 150 cc\par PSA (8/3/21) = 0.13\par \par He also notes occasional pain in the left scrotum.

## 2022-05-20 NOTE — ASSESSMENT
[FreeTextEntry1] : 87 yo male with LUTS.\par \par Lower urinary symptoms were reviewed including the potential etiologies of the patient's symptoms. The anatomy of the urinary tract was reviewed. Bladder, prostate, and urethral sources, as well as non-urologic sources, were discussed. Options for evaluation were discussed including cystoscopy, urodynamics, and pelvis ultrasonography. Management of symptoms were reviewed including no therapy, medical therapy, and surgical therapy. The long term sequelae of no treatment, including no adverse effects, potential for progressive lower urinary tract symptoms or deterioration, urinary retention, bladder dysfunction, and renal dysfunction were reviewed. \par \par Medical therapy for BPH (benign prostatic hyperplasia), or prostate enlargement, was reviewed including the physiology of medication therapy. Alpha blocker medication therapy was reviewed including adverse effects (including dizziness, hypotension, retrograde ejaculation, rhinitis, fatigue), proper usage, and contraindications. The use of 5 alpha reductase inhibitor medication therapy was reviewed including adverse effects (including decreased libido, erectile dysfunction, fatigue, reduction of PSA level), proper usage, and contraindications.  Combination medication therapy with alpha blocker and 5 alpha reductase inhibitor therapy was reviewed. \par \par Surgical options for BPH were discussed including Rezum, Urolift, laser therapies, TURP, and simple prostatectomy. Risks of surgery were reviewed.\par \par We also discussed reducing his evening fluid intake to see if this helps improve his nocturia.\par \par Lastly, we discussed his scrotal pain.  I suggest we get a scrotal US to further investigate the epididymal cyst. \par \par Plan:\par 1. Cont tamsulosin and finasteride\par 2. Reduce evening fluid intake\par 3. Scrotal US\par 4. F/u after # 4\par

## 2022-05-24 ENCOUNTER — APPOINTMENT (OUTPATIENT)
Dept: UROLOGY | Facility: CLINIC | Age: 87
End: 2022-05-24
Payer: MEDICARE

## 2022-05-24 DIAGNOSIS — N50.3 CYST OF EPIDIDYMIS: ICD-10-CM

## 2022-05-24 PROCEDURE — 76870 US EXAM SCROTUM: CPT

## 2022-05-24 PROCEDURE — 93976 VASCULAR STUDY: CPT

## 2022-07-09 ENCOUNTER — NON-APPOINTMENT (OUTPATIENT)
Age: 87
End: 2022-07-09

## 2022-08-23 ENCOUNTER — APPOINTMENT (OUTPATIENT)
Dept: UROLOGY | Facility: CLINIC | Age: 87
End: 2022-08-23

## 2022-08-31 ENCOUNTER — APPOINTMENT (OUTPATIENT)
Dept: UROLOGY | Facility: CLINIC | Age: 87
End: 2022-08-31

## 2022-08-31 VITALS
DIASTOLIC BLOOD PRESSURE: 73 MMHG | SYSTOLIC BLOOD PRESSURE: 151 MMHG | HEART RATE: 58 BPM | OXYGEN SATURATION: 96 % | TEMPERATURE: 97.7 F

## 2022-08-31 DIAGNOSIS — R39.9 UNSPECIFIED SYMPTOMS AND SIGNS INVOLVING THE GENITOURINARY SYSTEM: ICD-10-CM

## 2022-08-31 DIAGNOSIS — N40.1 BENIGN PROSTATIC HYPERPLASIA WITH LOWER URINARY TRACT SYMPMS: ICD-10-CM

## 2022-08-31 DIAGNOSIS — R35.1 NOCTURIA: ICD-10-CM

## 2022-08-31 DIAGNOSIS — N13.8 BENIGN PROSTATIC HYPERPLASIA WITH LOWER URINARY TRACT SYMPMS: ICD-10-CM

## 2022-08-31 DIAGNOSIS — B37.42 CANDIDAL BALANITIS: ICD-10-CM

## 2022-08-31 LAB
BILIRUB UR QL STRIP: NEGATIVE
CLARITY UR: CLEAR
COLLECTION METHOD: NORMAL
GLUCOSE UR-MCNC: NEGATIVE
HCG UR QL: 0.2 EU/DL
HGB UR QL STRIP.AUTO: NORMAL
KETONES UR-MCNC: NEGATIVE
LEUKOCYTE ESTERASE UR QL STRIP: NEGATIVE
NITRITE UR QL STRIP: NEGATIVE
PH UR STRIP: 6
PROT UR STRIP-MCNC: NEGATIVE
SP GR UR STRIP: 1.01

## 2022-08-31 PROCEDURE — 51798 US URINE CAPACITY MEASURE: CPT

## 2022-08-31 PROCEDURE — 99213 OFFICE O/P EST LOW 20 MIN: CPT

## 2022-08-31 RX ORDER — CLOTRIMAZOLE AND BETAMETHASONE DIPROPIONATE 10; .5 MG/G; MG/G
1-0.05 CREAM TOPICAL TWICE DAILY
Qty: 1 | Refills: 2 | Status: ACTIVE | COMMUNITY
Start: 2022-08-31 | End: 1900-01-01

## 2022-08-31 NOTE — HISTORY OF PRESENT ILLNESS
[FreeTextEntry1] : 5/19/22:\par 89 yo male with hx of LUTS.  He is most bothered by nocturia 2-3 times at night.  He also notes a sensation of incomplete emptying and occasional urgency.  He has a moderate stream.  He denies hematuria, dysuria, or incontinence.  He does not occasional urgency.  He does note a hx of constipation which he treats with stool softeners as needed. He currently does not feel constipated.  He denies excessive caffeine or fluid intake.  But does note that he drinks water at night before bedtime.  He has been on finasteride and tamsulosin for many years. \par \par PVR = 150 cc\par PSA (8/3/21) = 0.13\par \par He also notes occasional pain in the left scrotum.  \par \par **************\par 8/31/22:\par 89 yo male returns for follow up.  He continues to take finasteride and tamsulosin.  His LUTS is at its baseline.  He has not been able to cut back on his fluids in the evening and so his nocturia has not improved.  He was recently treated for a UTI with a course of abx.  He currently feels well without any bladder discomfort.  \par \par He also notes penile irritation which causes some discomfort. \par \par PVR = 211 cc (BPH and frequency)

## 2022-08-31 NOTE — PHYSICAL EXAM
[General Appearance - Well Developed] : well developed [Normal Appearance] : normal appearance [General Appearance - In No Acute Distress] : no acute distress [Abdomen Soft] : soft [Abdomen Tenderness] : non-tender [Penis Abnormality] : normal uncircumcised penis [FreeTextEntry1] : balanitis noted on foreskin and glans penis, mild phimosis [Skin Color & Pigmentation] : normal skin color and pigmentation [Heart Rate And Rhythm] : Heart rate and rhythm were normal [] : no respiratory distress [Oriented To Time, Place, And Person] : oriented to person, place, and time [Normal Station and Gait] : the gait and station were normal for the patient's age [No Focal Deficits] : no focal deficits

## 2022-09-01 LAB
APPEARANCE: CLEAR
BACTERIA: NEGATIVE
BILIRUBIN URINE: NEGATIVE
BLOOD URINE: NEGATIVE
COLOR: NORMAL
GLUCOSE QUALITATIVE U: NEGATIVE
HYALINE CASTS: 1 /LPF
KETONES URINE: NEGATIVE
LEUKOCYTE ESTERASE URINE: NEGATIVE
MICROSCOPIC-UA: NORMAL
NITRITE URINE: NEGATIVE
PH URINE: 6
PROTEIN URINE: NORMAL
RED BLOOD CELLS URINE: 2 /HPF
SPECIFIC GRAVITY URINE: 1.01
SQUAMOUS EPITHELIAL CELLS: 1 /HPF
UROBILINOGEN URINE: NORMAL
WHITE BLOOD CELLS URINE: 0 /HPF

## 2022-09-02 LAB — BACTERIA UR CULT: NORMAL

## 2022-09-22 NOTE — ED PROVIDER NOTE - PMH
Acute myocardial infarction  Myocardial infarction  Atherosclerosis of coronary artery  CAD (coronary artery disease)  Benign prostatic hypertrophy without lower urinary tract symptoms  BPH (benign prostatic hyperplasia)  Calculus of kidney  Kidney stone  Essential hypertension  HTN (hypertension) Complex Repair And M Plasty Text: The defect edges were debeveled with a #15 scalpel blade.  The primary defect was closed partially with a complex linear closure.  Given the location of the remaining defect, shape of the defect and the proximity to free margins an M plasty was deemed most appropriate for complete closure of the defect.  Using a sterile surgical marker, an appropriate advancement flap was drawn incorporating the defect and placing the expected incisions within the relaxed skin tension lines where possible.    The area thus outlined was incised deep to adipose tissue with a #15 scalpel blade.  The skin margins were undermined to an appropriate distance in all directions utilizing iris scissors.

## 2022-11-17 ENCOUNTER — APPOINTMENT (OUTPATIENT)
Dept: NEPHROLOGY | Facility: CLINIC | Age: 87
End: 2022-11-17

## 2022-11-17 VITALS — HEART RATE: 62 BPM | SYSTOLIC BLOOD PRESSURE: 128 MMHG | DIASTOLIC BLOOD PRESSURE: 76 MMHG

## 2022-11-17 DIAGNOSIS — I25.10 ATHEROSCLEROTIC HEART DISEASE OF NATIVE CORONARY ARTERY W/OUT ANGINA PECTORIS: ICD-10-CM

## 2022-11-17 PROCEDURE — 99214 OFFICE O/P EST MOD 30 MIN: CPT

## 2022-11-17 RX ORDER — CIPROFLOXACIN HYDROCHLORIDE 500 MG/1
500 TABLET, FILM COATED ORAL
Qty: 20 | Refills: 0 | Status: DISCONTINUED | COMMUNITY
Start: 2022-11-02

## 2022-11-17 RX ORDER — NITROFURANTOIN MACROCRYSTALS 100 MG/1
100 CAPSULE ORAL
Qty: 30 | Refills: 0 | Status: DISCONTINUED | COMMUNITY
Start: 2022-10-17

## 2022-11-17 RX ORDER — FLUTICASONE PROPIONATE 50 UG/1
50 SPRAY, METERED NASAL
Qty: 16 | Refills: 0 | Status: DISCONTINUED | COMMUNITY
Start: 2018-12-13 | End: 2022-11-17

## 2022-11-17 NOTE — HISTORY OF PRESENT ILLNESS
[FreeTextEntry1] : 87 yo man with CKD 3, CAD, recurrent UTIs, here for follow up evaluation\par UTIs over the summer- took a while to resolved- initially on cephalosporin then finally on cipro with good results\par being evaluated for LUTS- BPH\par no urinary symptoms today\par sciatica reasonable controlled; still does not try to walk extended distances\par No NSAID use.\par Denies flank pain dysuria or hematuria- \par no CP; no SOB\par \par

## 2022-11-17 NOTE — ASSESSMENT
[FreeTextEntry1] : all lab data was reviewed with patient in detail from 11/10/2022\par 87 yo man with CKD 3, proteinuria, hyperuricemia,CAD,  glucose intolerance and hyperuricemia.\par --CKD 3-  BUN/creat stable range 30/1.23; electrolytes good; limiting protein and avoiding NSAIDs\par defer sglt2i as gets recurent UTIs\par -glucose intolerance- A1C stable at 6.7% - reviewed diet again especially again reviewed lower CHO\par -hyperuricemia-  UA  controlled -  no recurrent gout attacks; c/w allopurinol- \par .-proteinuria--  Upcr 297- stable. low grade;  No RAAS  as episodes of hypotension in past \par -hemturia- resolved\par - secondary hyperparathyroidism PTH 83- mild elevation, acceptable\par \par f/u 6 months\par \par

## 2022-12-12 ENCOUNTER — APPOINTMENT (OUTPATIENT)
Dept: OTOLARYNGOLOGY | Facility: CLINIC | Age: 87
End: 2022-12-12

## 2022-12-12 VITALS
SYSTOLIC BLOOD PRESSURE: 160 MMHG | WEIGHT: 156 LBS | BODY MASS INDEX: 25.07 KG/M2 | HEART RATE: 57 BPM | HEIGHT: 66 IN | DIASTOLIC BLOOD PRESSURE: 74 MMHG | OXYGEN SATURATION: 98 % | TEMPERATURE: 97.8 F

## 2022-12-12 DIAGNOSIS — J38.7 OTHER DISEASES OF LARYNX: ICD-10-CM

## 2022-12-12 DIAGNOSIS — R13.10 DYSPHAGIA, UNSPECIFIED: ICD-10-CM

## 2022-12-12 DIAGNOSIS — H61.23 IMPACTED CERUMEN, BILATERAL: ICD-10-CM

## 2022-12-12 DIAGNOSIS — R49.8 OTHER VOICE AND RESONANCE DISORDERS: ICD-10-CM

## 2022-12-12 DIAGNOSIS — J34.3 HYPERTROPHY OF NASAL TURBINATES: ICD-10-CM

## 2022-12-12 DIAGNOSIS — R49.0 DYSPHONIA: ICD-10-CM

## 2022-12-12 DIAGNOSIS — R09.81 NASAL CONGESTION: ICD-10-CM

## 2022-12-12 PROCEDURE — 99214 OFFICE O/P EST MOD 30 MIN: CPT | Mod: 25

## 2022-12-12 PROCEDURE — 31579 LARYNGOSCOPY TELESCOPIC: CPT

## 2022-12-12 PROCEDURE — 69210 REMOVE IMPACTED EAR WAX UNI: CPT

## 2022-12-12 RX ORDER — FLUTICASONE PROPIONATE 50 UG/1
50 SPRAY, METERED NASAL
Qty: 1 | Refills: 2 | Status: ACTIVE | COMMUNITY
Start: 2022-12-12 | End: 1900-01-01

## 2022-12-12 NOTE — HISTORY OF PRESENT ILLNESS
[de-identified] : 12/12/22\par 89M professional  presents with voice changes and throat irritation for 3 weeks. He reports that he loses resonance in his voice with prolonged voice use.  Also reports voice hoarseness. Voice complaints are intermittent. 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 He tries to practice daily. Denies issues chewing, eating or swallowing. Denies breathing difficulties. No recent URI. No tobacco use. No fevers, night sweats, weight loss or referred otalgia. \par \par He also reports nasal congestion for 2-3 months. + facial fullness. Hx of septoplasty. No drainage or dental pain. Symptoms are constant. He's been using nasal saline irrigation and [place] with minimal improvement. Does not get frequent sinus infections. No other ENT issues.

## 2022-12-12 NOTE — PROCEDURE
[FreeTextEntry3] : -\par Cerumen Removal/Ear Cleaning for Otitis Externa\par Pre-operative Diagnosis: bilateral Cerumen Impaction\par Post-operative Diagnosis: Same\par Procedure:  Binocular microscopy with cerumen removal- [id number]\par Procedure Details:  \par The patient was placed in the supine position.  The operating microscope was positioned.  I then placed the ear speculum in the EAC.  Cerumen was then removed using a mixture of otologic curettes, and suction.  The TM was noted to be intact. I then performed the procedure of the opposite ear in similar fashion.  The patient tolerated procedure well.\par \par Findings: \par Bilateral Ear Canal - normal\par Bilateral Tympanic Membrane - normal\par \par Recommendations: Debrox\par Complications: None\par \par  [de-identified] : -\par Procedure: Flexible Laryngoscopy with Stroboscopy\par \par Pre-operative Diagnosis: dysphonia \par Post-operative Diagnosis: presbylarynges, presbyphonia and pooling of saliva \par Anesthesia: Topical - 1% Lidocaine/Phenylephrine \par \par Procedure Details: \par The patient was placed in the sitting position. After decongestant and anesthesia were applied the laryngoscope was passed. The nasal cavities, nasopharynx, oropharynx, hypopharynx, and larynx were all examined. Vocal folds were examined during respiration and phonation. The following findings were noted:\par \par Findings: \par Nose: Septum is midline, turbinates are normal, nasal airways patent, mucosa normal\par Nasopharynx: Adenoids normal, no masses, eustachian tube normal\par Oropharynx: Pharyngeal walls symmetric and without lesion. Tonsils/fossae symmetric\par Hypopharynx: Hypopharynx and pyriform sinuses without lesion. No masses or asymmetry. + pooling of secretions.\par Larynx: Epiglottis and aryepiglottic folds were sharp and crisp bilaterally. Bilateral false vocal folds normal appearance. Airway was widely patent.\par \par Strobe Exam Ratings\par 		\par TVF Appearance: age related atrophy bilaterally \par TVF Mobility: normal mobility bilaterally \par Edema/hypertrophy: +\par Mucus on TVF: +\par Glottic Closure: + glottic gap\par Mucosal Wave: reduced\par Amplitude of Vibration: reduced\par Phase: asymmetric\par Supraglottic Hyperfunction: +\par Other Findings:\par \par Condition: Stable. Patient tolerated procedure well.\par \par Complications: None\par \par

## 2022-12-12 NOTE — REASON FOR VISIT
[Initial Consultation] : an initial consultation for [FreeTextEntry2] : dysphonia and nasal congestion

## 2022-12-12 NOTE — PHYSICAL EXAM
[Midline] : trachea located in midline position [Laryngoscopy Performed] : laryngoscopy was performed, see procedure section for findings [Normal] : inferior turbinates and middle turbinates are normal [FreeTextEntry1] : voice intermittently hoarse  [de-identified] : bilateral EAC impacted with cerumen, removed without issue  [de-identified] : h [de-identified] : elongated uvula

## 2022-12-12 NOTE — ASSESSMENT
[FreeTextEntry1] : 89 year old male presents with dysphonia and nasal congestion. On exam today the nasal cavity does appear to be essentially patent.  In the throat there appears to be some pooling of secretions in the piriform sinus bilaterally.  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  At this time I am recommending nasal saline irrigation as well as nasal steroids.  I am also recommending modified barium swallow to further evaluate why food is pooling and not passing his easily into the esophagus.  I am also recommending consultation with speech pathology for voice evaluation and potentially therapy.  Patient will follow-up in 2 to 3 months or, sooner should symptoms worsen or fail to improve.\par \par   Of note ears cleaned bilaterally without issue.\par \par –Nasal saline, nasal steroids\par – Modified barium swallow\par – Speech pathology consultation\par – Follow-up 2 to 3 months, after the above to review results.

## 2023-02-10 ENCOUNTER — APPOINTMENT (OUTPATIENT)
Dept: RADIOLOGY | Facility: HOSPITAL | Age: 88
End: 2023-02-10

## 2023-05-31 NOTE — ASSESSMENT
Pt reevaluated by er physician. Pt informed of his test reports and plan of care.  verblizing understanding [FreeTextEntry1] : 87 yo male with BPH and baseline LUTS on dual medical therapy and stable PVR around 200 cc now with balanitis.  He will continue dual medical therapy.\par \par We discussed balanitis.  I encouraged him to try to keep the glans penis and foreskin dry after voiding.  I will prescribe clotrimazole-betamethasone cream to treat the balanitis. \par \par Plan:\par 1. Cont dual therapy\par 2. Topical antifungal, keep glans and foreskin\par 3. Reduce evening fluid intake\par 4. RTC 6 months

## 2023-06-06 ENCOUNTER — APPOINTMENT (OUTPATIENT)
Dept: NEPHROLOGY | Facility: CLINIC | Age: 88
End: 2023-06-06
Payer: MEDICARE

## 2023-06-06 VITALS — DIASTOLIC BLOOD PRESSURE: 80 MMHG | HEART RATE: 60 BPM | SYSTOLIC BLOOD PRESSURE: 128 MMHG

## 2023-06-06 PROCEDURE — 99214 OFFICE O/P EST MOD 30 MIN: CPT

## 2023-06-07 NOTE — ASSESSMENT
[FreeTextEntry1] : all lab data was reviewed with patient in detail from 5/30/2023\par 89 yo man with CKD 3, proteinuria, hyperuricemia,CAD,  glucose intolerance and hyperuricemia.\par --CKD 3-  BUN/creat stable range, 38/1.22; electrolytes good; \par limiting protein and avoiding NSAIDs\par defer sglt2i as h/o recurrent UTIs\par -glucose intolerance- A1C stable 6.7% - reviewed diet again try smaller portion sizes when eating CHOs\par -hyperuricemia-  UA  controlled -  no recurrent gout attacks; c/w allopurinol- \par .-proteinuria--  Upcr 311 stable. low grade;  No RAAS  as episodes of hypotension in past \par -hemturia- resolved\par - secondary hyperparathyroidism PTH normalized- 49\par \par f/u 6 months\par \par

## 2023-06-07 NOTE — HISTORY OF PRESENT ILLNESS
[FreeTextEntry1] : 88 yo man here for f/u evaluation of CKD 3, CAD, recurrent UTIs.\par able to use the treadmill 3 x weekly- goes to gym to lift weights 3 x weekly as  well\par no recurrent UTIs since last OV- no urinary symptoms today\par h/o LUTS- BPH\par sciatica pain always present- but able to cope- on no regular meds for it. -No recent follow up for this- can consider if feels the pain is getting worse\par does not try to walk extended distances- pain increase- okay\par No NSAID use.\par Denies flank pain dysuria or hematuria- \par no CP; no SOB\par \par

## 2023-11-03 RX ORDER — BLOOD SUGAR DIAGNOSTIC
STRIP MISCELLANEOUS TWICE DAILY
Qty: 100 | Refills: 5 | Status: ACTIVE | COMMUNITY
Start: 2021-11-22 | End: 1900-01-01

## 2023-11-10 ENCOUNTER — OUTPATIENT (OUTPATIENT)
Dept: OUTPATIENT SERVICES | Facility: HOSPITAL | Age: 88
LOS: 1 days | End: 2023-11-10
Payer: MEDICARE

## 2023-11-10 DIAGNOSIS — I25.10 ATHEROSCLEROTIC HEART DISEASE OF NATIVE CORONARY ARTERY WITHOUT ANGINA PECTORIS: ICD-10-CM

## 2023-11-10 DIAGNOSIS — I25.9 CHRONIC ISCHEMIC HEART DISEASE, UNSPECIFIED: ICD-10-CM

## 2023-11-10 PROCEDURE — 93005 ELECTROCARDIOGRAM TRACING: CPT

## 2023-11-10 PROCEDURE — C8929: CPT

## 2023-11-10 PROCEDURE — 93010 ELECTROCARDIOGRAM REPORT: CPT

## 2023-11-10 PROCEDURE — 93306 TTE W/DOPPLER COMPLETE: CPT | Mod: 26

## 2024-04-23 RX ORDER — ISOSORBIDE MONONITRATE 30 MG/1
30 TABLET, EXTENDED RELEASE ORAL
Qty: 90 | Refills: 3 | Status: ACTIVE | COMMUNITY
Start: 2017-05-23 | End: 1900-01-01

## 2024-04-23 RX ORDER — FINASTERIDE 5 MG/1
5 TABLET, FILM COATED ORAL DAILY
Qty: 90 | Refills: 3 | Status: ACTIVE | COMMUNITY
Start: 2018-12-30 | End: 1900-01-01

## 2024-05-06 RX ORDER — METOPROLOL TARTRATE 50 MG/1
50 TABLET, FILM COATED ORAL
Qty: 90 | Refills: 1 | Status: ACTIVE | COMMUNITY
Start: 2017-08-22 | End: 1900-01-01

## 2024-05-06 RX ORDER — ALLOPURINOL 300 MG/1
300 TABLET ORAL
Qty: 90 | Refills: 1 | Status: ACTIVE | COMMUNITY
Start: 2017-07-10 | End: 1900-01-01

## 2024-05-14 NOTE — ED PROVIDER NOTE - CLINICAL SUMMARY MEDICAL DECISION MAKING FREE TEXT BOX
Pt was contacted and a follow up appointment was scheduled.  
chest pain. pt well appearing. ecg no ischemic changes. vss. pt without cp at this time. labs noted. troponin negative x 1. case d/w Dr Nice and recommend symptomatic tx and f/u as outpt if 2nd troponin negative.

## 2024-05-29 RX ORDER — METOPROLOL TARTRATE 25 MG/1
25 TABLET, FILM COATED ORAL
Qty: 90 | Refills: 3 | Status: ACTIVE | COMMUNITY
Start: 2021-02-22 | End: 1900-01-01

## 2024-06-11 ENCOUNTER — APPOINTMENT (OUTPATIENT)
Dept: NEPHROLOGY | Facility: CLINIC | Age: 89
End: 2024-06-11
Payer: MEDICARE

## 2024-06-11 DIAGNOSIS — R80.9 PROTEINURIA, UNSPECIFIED: ICD-10-CM

## 2024-06-11 DIAGNOSIS — M10.9 GOUT, UNSPECIFIED: ICD-10-CM

## 2024-06-11 DIAGNOSIS — R31.29 OTHER MICROSCOPIC HEMATURIA: ICD-10-CM

## 2024-06-11 DIAGNOSIS — N18.30 CHRONIC KIDNEY DISEASE, STAGE 3 UNSPECIFIED: ICD-10-CM

## 2024-06-11 DIAGNOSIS — E74.39 OTHER DISORDERS OF INTESTINAL CARBOHYDRATE ABSORPTION: ICD-10-CM

## 2024-06-11 PROCEDURE — G2211 COMPLEX E/M VISIT ADD ON: CPT

## 2024-06-11 PROCEDURE — 99214 OFFICE O/P EST MOD 30 MIN: CPT

## 2024-06-13 VITALS — HEART RATE: 62 BPM | DIASTOLIC BLOOD PRESSURE: 80 MMHG | SYSTOLIC BLOOD PRESSURE: 130 MMHG

## 2024-06-13 NOTE — ASSESSMENT
[FreeTextEntry1] : all lab data was reviewed with patient in detail from 6/10/2024 89 yo man with CKD 3, proteinuria, hyperuricemia,CAD,  glucose intolerance and hyperuricemia. --CKD 3-  BUN/creat acceptable 41/1.27; electrolytes WNL; limiting protein and avoiding NSAIDs avoid sglt2i as h/o recurrent UTIs -glucose intolerance- A1C mild uptick to 6.9% counseled on need to reduce CHO intake- will need to add antihyperglycemic med if does not improve -hyperuricemia-  UA  controlled -  no recurrent gout attacks; c/w allopurinol-  -proteinuria-- low grade;  No RASi  as episodes of symptomatic hypotension in past  and eGFR okay given advanced age -hematuria- resolved - secondary hyperparathyroidism - prior PTH normalized repeat next OV  f/u 6 months

## 2024-06-18 RX ORDER — ATORVASTATIN CALCIUM 40 MG/1
40 TABLET, FILM COATED ORAL
Qty: 90 | Refills: 3 | Status: ACTIVE | COMMUNITY
Start: 2017-06-01 | End: 1900-01-01

## 2024-06-25 RX ORDER — TAMSULOSIN HYDROCHLORIDE 0.4 MG/1
0.4 CAPSULE ORAL
Qty: 90 | Refills: 3 | Status: ACTIVE | COMMUNITY
Start: 2017-05-22 | End: 1900-01-01

## 2024-07-26 ENCOUNTER — APPOINTMENT (OUTPATIENT)
Dept: OTOLARYNGOLOGY | Facility: CLINIC | Age: 89
End: 2024-07-26

## 2024-07-26 VITALS
WEIGHT: 155 LBS | BODY MASS INDEX: 24.91 KG/M2 | DIASTOLIC BLOOD PRESSURE: 76 MMHG | HEART RATE: 64 BPM | HEIGHT: 66 IN | SYSTOLIC BLOOD PRESSURE: 180 MMHG | OXYGEN SATURATION: 98 % | TEMPERATURE: 97.8 F

## 2024-07-26 VITALS — SYSTOLIC BLOOD PRESSURE: 184 MMHG | DIASTOLIC BLOOD PRESSURE: 78 MMHG

## 2024-07-26 DIAGNOSIS — R09.81 NASAL CONGESTION: ICD-10-CM

## 2024-07-26 DIAGNOSIS — R13.10 DYSPHAGIA, UNSPECIFIED: ICD-10-CM

## 2024-07-26 DIAGNOSIS — R49.0 DYSPHONIA: ICD-10-CM

## 2024-07-26 PROCEDURE — 31233 NSL/SINS NDSC DX MAX SINUSC: CPT | Mod: 50

## 2024-07-26 PROCEDURE — 99214 OFFICE O/P EST MOD 30 MIN: CPT | Mod: 25

## 2024-07-26 PROCEDURE — G2211 COMPLEX E/M VISIT ADD ON: CPT

## 2024-07-26 NOTE — PHYSICAL EXAM
[Normal] : lingual tonsils are normal [Midline] : trachea located in midline position [Laryngoscopy Performed] : laryngoscopy was performed, see procedure section for findings

## 2024-10-19 ENCOUNTER — INPATIENT (INPATIENT)
Facility: HOSPITAL | Age: 89
LOS: 2 days | Discharge: HOME | DRG: 291 | End: 2024-10-22
Attending: INTERNAL MEDICINE | Admitting: STUDENT IN AN ORGANIZED HEALTH CARE EDUCATION/TRAINING PROGRAM
Payer: MEDICARE

## 2024-10-19 VITALS
TEMPERATURE: 98 F | HEART RATE: 52 BPM | DIASTOLIC BLOOD PRESSURE: 73 MMHG | RESPIRATION RATE: 19 BRPM | OXYGEN SATURATION: 97 % | WEIGHT: 149.91 LBS | SYSTOLIC BLOOD PRESSURE: 172 MMHG | HEIGHT: 66 IN

## 2024-10-19 DIAGNOSIS — I50.9 HEART FAILURE, UNSPECIFIED: ICD-10-CM

## 2024-10-19 DIAGNOSIS — N40.0 BENIGN PROSTATIC HYPERPLASIA WITHOUT LOWER URINARY TRACT SYMPTOMS: ICD-10-CM

## 2024-10-19 DIAGNOSIS — R79.89 OTHER SPECIFIED ABNORMAL FINDINGS OF BLOOD CHEMISTRY: ICD-10-CM

## 2024-10-19 DIAGNOSIS — I10 ESSENTIAL (PRIMARY) HYPERTENSION: ICD-10-CM

## 2024-10-19 LAB
ADD ON TEST-SPECIMEN IN LAB: SIGNIFICANT CHANGE UP
ANION GAP SERPL CALC-SCNC: 9 MMOL/L — SIGNIFICANT CHANGE UP (ref 5–17)
APPEARANCE UR: CLEAR — SIGNIFICANT CHANGE UP
APTT BLD: 27.3 SEC — SIGNIFICANT CHANGE UP (ref 24.5–35.6)
BACTERIA # UR AUTO: NEGATIVE /HPF — SIGNIFICANT CHANGE UP
BASE EXCESS BLDV CALC-SCNC: 2.4 MMOL/L — SIGNIFICANT CHANGE UP (ref -2–3)
BASOPHILS # BLD AUTO: 0.03 K/UL — SIGNIFICANT CHANGE UP (ref 0–0.2)
BASOPHILS NFR BLD AUTO: 0.5 % — SIGNIFICANT CHANGE UP (ref 0–2)
BILIRUB UR-MCNC: NEGATIVE — SIGNIFICANT CHANGE UP
BUN SERPL-MCNC: 24 MG/DL — HIGH (ref 7–23)
CA-I SERPL-SCNC: 1.25 MMOL/L — SIGNIFICANT CHANGE UP (ref 1.15–1.33)
CALCIUM SERPL-MCNC: 9.1 MG/DL — SIGNIFICANT CHANGE UP (ref 8.4–10.5)
CHLORIDE SERPL-SCNC: 100 MMOL/L — SIGNIFICANT CHANGE UP (ref 96–108)
CK MB CFR SERPL CALC: 4.9 NG/ML — SIGNIFICANT CHANGE UP (ref 0–6.7)
CK SERPL-CCNC: 99 U/L — SIGNIFICANT CHANGE UP (ref 30–200)
CO2 BLDV-SCNC: 32 MMOL/L — HIGH (ref 22–26)
CO2 SERPL-SCNC: 28 MMOL/L — SIGNIFICANT CHANGE UP (ref 22–31)
COLOR SPEC: YELLOW — SIGNIFICANT CHANGE UP
CREAT SERPL-MCNC: 1.21 MG/DL — SIGNIFICANT CHANGE UP (ref 0.5–1.3)
DIFF PNL FLD: ABNORMAL
EGFR: 57 ML/MIN/1.73M2 — LOW
EOSINOPHIL # BLD AUTO: 0.17 K/UL — SIGNIFICANT CHANGE UP (ref 0–0.5)
EOSINOPHIL NFR BLD AUTO: 2.8 % — SIGNIFICANT CHANGE UP (ref 0–6)
FLUAV AG NPH QL: SIGNIFICANT CHANGE UP
FLUBV AG NPH QL: SIGNIFICANT CHANGE UP
GAS PNL BLDV: 134 MMOL/L — LOW (ref 136–145)
GAS PNL BLDV: SIGNIFICANT CHANGE UP
GLUCOSE SERPL-MCNC: 196 MG/DL — HIGH (ref 70–99)
GLUCOSE UR QL: NEGATIVE MG/DL — SIGNIFICANT CHANGE UP
HCO3 BLDV-SCNC: 30 MMOL/L — HIGH (ref 22–29)
HCT VFR BLD CALC: 38.3 % — LOW (ref 39–50)
HGB BLD-MCNC: 12.2 G/DL — LOW (ref 13–17)
IMM GRANULOCYTES NFR BLD AUTO: 0.2 % — SIGNIFICANT CHANGE UP (ref 0–0.9)
INR BLD: 1.05 — SIGNIFICANT CHANGE UP (ref 0.85–1.16)
KETONES UR-MCNC: NEGATIVE MG/DL — SIGNIFICANT CHANGE UP
LEUKOCYTE ESTERASE UR-ACNC: NEGATIVE — SIGNIFICANT CHANGE UP
LYMPHOCYTES # BLD AUTO: 0.81 K/UL — LOW (ref 1–3.3)
LYMPHOCYTES # BLD AUTO: 13.3 % — SIGNIFICANT CHANGE UP (ref 13–44)
MAGNESIUM SERPL-MCNC: 2.1 MG/DL — SIGNIFICANT CHANGE UP (ref 1.6–2.6)
MCHC RBC-ENTMCNC: 30 PG — SIGNIFICANT CHANGE UP (ref 27–34)
MCHC RBC-ENTMCNC: 31.9 GM/DL — LOW (ref 32–36)
MCV RBC AUTO: 94.1 FL — SIGNIFICANT CHANGE UP (ref 80–100)
MONOCYTES # BLD AUTO: 0.38 K/UL — SIGNIFICANT CHANGE UP (ref 0–0.9)
MONOCYTES NFR BLD AUTO: 6.2 % — SIGNIFICANT CHANGE UP (ref 2–14)
NEUTROPHILS # BLD AUTO: 4.71 K/UL — SIGNIFICANT CHANGE UP (ref 1.8–7.4)
NEUTROPHILS NFR BLD AUTO: 77 % — SIGNIFICANT CHANGE UP (ref 43–77)
NITRITE UR-MCNC: NEGATIVE — SIGNIFICANT CHANGE UP
NRBC # BLD: 0 /100 WBCS — SIGNIFICANT CHANGE UP (ref 0–0)
NT-PROBNP SERPL-SCNC: 2654 PG/ML — HIGH (ref 0–300)
PCO2 BLDV: 58 MMHG — HIGH (ref 42–55)
PH BLDV: 7.32 — SIGNIFICANT CHANGE UP (ref 7.32–7.43)
PH UR: 6.5 — SIGNIFICANT CHANGE UP (ref 5–8)
PLATELET # BLD AUTO: 121 K/UL — LOW (ref 150–400)
PO2 BLDV: 35 MMHG — SIGNIFICANT CHANGE UP (ref 25–45)
POTASSIUM BLDV-SCNC: 4.2 MMOL/L — SIGNIFICANT CHANGE UP (ref 3.5–5.1)
POTASSIUM SERPL-MCNC: 4.3 MMOL/L — SIGNIFICANT CHANGE UP (ref 3.5–5.3)
POTASSIUM SERPL-SCNC: 4.3 MMOL/L — SIGNIFICANT CHANGE UP (ref 3.5–5.3)
PROT UR-MCNC: SIGNIFICANT CHANGE UP MG/DL
PROTHROM AB SERPL-ACNC: 12.1 SEC — SIGNIFICANT CHANGE UP (ref 9.9–13.4)
RBC # BLD: 4.07 M/UL — LOW (ref 4.2–5.8)
RBC # FLD: 13.2 % — SIGNIFICANT CHANGE UP (ref 10.3–14.5)
RBC CASTS # UR COMP ASSIST: 1 /HPF — SIGNIFICANT CHANGE UP (ref 0–4)
RSV RNA NPH QL NAA+NON-PROBE: SIGNIFICANT CHANGE UP
SAO2 % BLDV: 58.3 % — LOW (ref 67–88)
SARS-COV-2 RNA SPEC QL NAA+PROBE: SIGNIFICANT CHANGE UP
SODIUM SERPL-SCNC: 137 MMOL/L — SIGNIFICANT CHANGE UP (ref 135–145)
SP GR SPEC: 1.01 — SIGNIFICANT CHANGE UP (ref 1–1.03)
SQUAMOUS # UR AUTO: 0 /HPF — SIGNIFICANT CHANGE UP (ref 0–5)
TROPONIN T, HIGH SENSITIVITY RESULT: 48 NG/L — SIGNIFICANT CHANGE UP (ref 0–51)
TROPONIN T, HIGH SENSITIVITY RESULT: 53 NG/L — CRITICAL HIGH (ref 0–51)
UROBILINOGEN FLD QL: 0.2 MG/DL — SIGNIFICANT CHANGE UP (ref 0.2–1)
WBC # BLD: 6.11 K/UL — SIGNIFICANT CHANGE UP (ref 3.8–10.5)
WBC # FLD AUTO: 6.11 K/UL — SIGNIFICANT CHANGE UP (ref 3.8–10.5)
WBC UR QL: 0 /HPF — SIGNIFICANT CHANGE UP (ref 0–5)

## 2024-10-19 PROCEDURE — 71045 X-RAY EXAM CHEST 1 VIEW: CPT | Mod: 26

## 2024-10-19 PROCEDURE — 99285 EMERGENCY DEPT VISIT HI MDM: CPT

## 2024-10-19 PROCEDURE — 93010 ELECTROCARDIOGRAM REPORT: CPT

## 2024-10-19 PROCEDURE — 99223 1ST HOSP IP/OBS HIGH 75: CPT

## 2024-10-19 RX ORDER — ASPIRIN/MAG CARB/ALUMINUM AMIN 325 MG
325 TABLET ORAL ONCE
Refills: 0 | Status: COMPLETED | OUTPATIENT
Start: 2024-10-19 | End: 2024-10-19

## 2024-10-19 RX ORDER — ISOSORBIDE MONONITRATE 60 MG/1
30 TABLET, EXTENDED RELEASE ORAL DAILY
Refills: 0 | Status: DISCONTINUED | OUTPATIENT
Start: 2024-10-19 | End: 2024-10-20

## 2024-10-19 RX ORDER — HEPARIN SODIUM 10000 [USP'U]/ML
4100 INJECTION INTRAVENOUS; SUBCUTANEOUS EVERY 6 HOURS
Refills: 0 | Status: DISCONTINUED | OUTPATIENT
Start: 2024-10-19 | End: 2024-10-21

## 2024-10-19 RX ORDER — ALLOPURINOL 100 MG
300 TABLET ORAL DAILY
Refills: 0 | Status: DISCONTINUED | OUTPATIENT
Start: 2024-10-19 | End: 2024-10-22

## 2024-10-19 RX ORDER — FAMOTIDINE 10 MG/ML
20 INJECTION INTRAVENOUS DAILY
Refills: 0 | Status: DISCONTINUED | OUTPATIENT
Start: 2024-10-19 | End: 2024-10-22

## 2024-10-19 RX ORDER — HEPARIN SODIUM 10000 [USP'U]/ML
INJECTION INTRAVENOUS; SUBCUTANEOUS
Qty: 25000 | Refills: 0 | Status: DISCONTINUED | OUTPATIENT
Start: 2024-10-19 | End: 2024-10-20

## 2024-10-19 RX ORDER — ALBUTEROL 90 MCG
2.5 AEROSOL (GRAM) INHALATION ONCE
Refills: 0 | Status: COMPLETED | OUTPATIENT
Start: 2024-10-19 | End: 2024-10-19

## 2024-10-19 RX ORDER — FUROSEMIDE 40 MG
40 TABLET ORAL ONCE
Refills: 0 | Status: COMPLETED | OUTPATIENT
Start: 2024-10-19 | End: 2024-10-19

## 2024-10-19 RX ORDER — ASPIRIN/MAG CARB/ALUMINUM AMIN 325 MG
81 TABLET ORAL DAILY
Refills: 0 | Status: DISCONTINUED | OUTPATIENT
Start: 2024-10-20 | End: 2024-10-22

## 2024-10-19 RX ORDER — METOPROLOL TARTRATE 50 MG
1 TABLET ORAL
Refills: 0 | DISCHARGE

## 2024-10-19 RX ORDER — TAMSULOSIN HCL 0.4 MG
0.4 CAPSULE ORAL AT BEDTIME
Refills: 0 | Status: DISCONTINUED | OUTPATIENT
Start: 2024-10-19 | End: 2024-10-22

## 2024-10-19 RX ORDER — METOPROLOL TARTRATE 50 MG
50 TABLET ORAL DAILY
Refills: 0 | Status: DISCONTINUED | OUTPATIENT
Start: 2024-10-19 | End: 2024-10-22

## 2024-10-19 RX ORDER — METOPROLOL TARTRATE 50 MG
25 TABLET ORAL AT BEDTIME
Refills: 0 | Status: DISCONTINUED | OUTPATIENT
Start: 2024-10-19 | End: 2024-10-22

## 2024-10-19 RX ORDER — FAMOTIDINE 10 MG/ML
1 INJECTION INTRAVENOUS
Refills: 0 | DISCHARGE

## 2024-10-19 RX ADMIN — ISOSORBIDE MONONITRATE 30 MILLIGRAM(S): 60 TABLET, EXTENDED RELEASE ORAL at 23:18

## 2024-10-19 RX ADMIN — Medication 40 MILLIGRAM(S): at 21:12

## 2024-10-19 RX ADMIN — Medication 0.4 MILLIGRAM(S): at 21:13

## 2024-10-19 RX ADMIN — Medication 2.5 MILLIGRAM(S): at 15:31

## 2024-10-19 RX ADMIN — HEPARIN SODIUM 800 UNIT(S)/HR: 10000 INJECTION INTRAVENOUS; SUBCUTANEOUS at 21:01

## 2024-10-19 RX ADMIN — Medication 325 MILLIGRAM(S): at 17:22

## 2024-10-19 RX ADMIN — Medication 40 MILLIGRAM(S): at 21:03

## 2024-10-19 NOTE — H&P ADULT - PROBLEM SELECTOR PLAN 3
will resume HOME MEDS: Metoprolol Succinate ER 50mg PO dailly and 25mg PO qhs and Imdur ER 30mg PO daily. will resume HOME MEDS: Metoprolol Tartrate 50mg PO daily and 25mg PO qhs and Imdur ER 30mg PO daily.

## 2024-10-19 NOTE — ED ADULT TRIAGE NOTE - CHIEF COMPLAINT QUOTE
as per EMS patient started to have shortness of breath yesterday and is worsen today so they called 911. Patient has hx of 5 stents in the past.

## 2024-10-19 NOTE — ED ADULT NURSE NOTE - OBJECTIVE STATEMENT
89y Male A&ox3 to ED c/o sob and hare x2 days. Pt does not use supplemental  o2 at home. No edema to b/l lower ex noted. Denies chest pain, fever, chills, n/v/d, Hx of 5 stents.

## 2024-10-19 NOTE — H&P ADULT - ASSESSMENT
89 yr old M with PMHx of HTN, hyperlipidemia, DM, BPH, CAD s/p multiple PCIs (most recently in 2015@[facility]), who presents to [facility] ED 10/19/24 c/o acutely worsening SOB x 1 day, EKG nonischemic, Trop T 53, CXR with mild pulmonary vascular congestion, admitted to [facility] cardiac telemetry for management of acute decompensated CHF in setting of ACS.        ASA: III			Mallampati class: II		    Sedation Plan: Moderate    Patient Is Suitable Candidate For Sedation: Yes    Cardiac: Risks & benefits of procedure and alternative therapy have been explained to the patient &/or HCP including but not limited to: allergic reaction, bleeding, infection, arrhythmia, renal and vascular compromise, limb damage, MI, CVA, emergent CABG and death. Informed consent obtained and in chart.

## 2024-10-19 NOTE — H&P ADULT - PROBLEM SELECTOR PLAN 1
+JVD, bibasilar crackles, sating 95% on RA.  --CXR with mild pulmonary vascular congestion.  --DIURESIS: given Lasix 40mg IV x 1 dose upon arrival. Will continue Lasix 20mg IV q 12hrs (as pt is Lasix naive).  --GDMT: continue Isosorbide Mononitrate 30mg PO daily and BB regimen.   --CORE MEASURES: strict I/Os, daily weights.  --obtain TTE for structural assessment.       ***Prior TTE 11/10/23: normal LV size/systolic function, EF:60%. Normal RV size and systolic function. Mild AR, no pulm HTN. +faint bibasilar crackles, trace sating 95% on RA.  --CXR with mild pulmonary vascular congestion.  --DIURESIS: given Lasix 40mg IV x 1 dose upon arrival. Will continue Lasix 20mg IV q 12hrs (as pt is Lasix naive).  --GDMT: continue Isosorbide Mononitrate 30mg PO daily and BB regimen.   --CORE MEASURES: strict I/Os, daily weights.  --obtain TTE for structural assessment.       ***Prior TTE 11/10/23: normal LV size/systolic function, EF:60%. Normal RV size and systolic function. Mild AR, no pulm HTN. +faint bibasilar crackles, trace pedal edema bilaterally, sating 95% on RA.  --CXR with mild pulmonary vascular congestion, BNP 2654.  --DIURESIS: given Lasix 40mg IV x 1 dose upon arrival. Will continue Lasix 20mg IV q 12hrs (as pt is Lasix naive).  --GDMT: continue Isosorbide Mononitrate 30mg PO daily and BB regimen.   --CORE MEASURES: strict I/Os, daily weights.  --obtain TTE for structural assessment.       ***Prior TTE 11/10/23: normal LV size/systolic function, EF:60%. Normal RV size and systolic function. Mild AR, no pulm HTN.

## 2024-10-19 NOTE — ED PROVIDER NOTE - PROGRESS NOTE DETAILS
Spoke w/ Dr Nice - admit to cardiac tele for r/o, echo. He will reach out to either Dr Cordero or Dr Sanabria to eval on Monday for possible angiogram. Spoke w/ Dr Abbasi and cardio PA for admission

## 2024-10-19 NOTE — H&P ADULT - PROBLEM SELECTOR PLAN 2
Chief Complaint   Patient presents with    Follow-up       1. Have you been to the ER, urgent care clinic since your last visit? Hospitalized since your last visit? no    2. Have you seen or consulted any other health care providers outside of the [address] [place] [address] since your last visit? Include any pap smears or colon screening.   no Trop T 53, CK/CKMB negative.  --will continue to trend CE.  --Patient received ASA 325mg PO x 1 dose in ED. Initiating Heparin gtt per ACS protocol.  --tentative plan for possible cardiac catheterization on Monday 10/21/24 as recommended by outpatient cardiologist Dr. Nice. Trop T 53, CK/CKMB negative.  --will continue to trend CE.  --Patient received ASA 325mg PO x 1 dose in ED. Initiating Heparin gtt per ACS protocol x 48 hours.  --tentative plan for possible cardiac catheterization on Monday 10/21/24 as recommended by outpatient cardiologist Dr. Nice.

## 2024-10-19 NOTE — H&P ADULT - HISTORY OF PRESENT ILLNESS
89 yr old M with PMHx of HTN, hyperlipidemia, DM, BPH, CAD s/p multiple PCIs (most recently in 2015@[facility]), who presents to [facility] ED 10/19/24 c/o acutely worsening SOB x 1 day.    In ED, BP: 172/73, HR: 50s, RR:18, Temp: 97.7F, O2 sat: 97% RA. EKG revealed Sinus bradycardia@50BPM without acute ST/T wave changes. CXR unremarkable.Labs notable for: Trop T 53, BNP 2654, COVID/RSV/Flu negative.    Patient currently stable, admitted to [facility] cardiac telemetry for serial cardiac enzymes and further ischemic work-up.     89 yr old M with PMHx of HTN, hyperlipidemia, DM, BPH, CAD s/p multiple PCIs (most recently in 2015@[facility]), who presents to [facility] ED 10/19/24 c/o acutely worsening SOB x 1 day.    In ED, BP: 172/73, HR: 50s, RR:18, Temp: 97.7F, O2 sat: 97% RA. EKG revealed Sinus bradycardia@50BPM without acute ST/T wave changes. CXR unremarkable.Labs notable for: Trop T 53, BNP 2654, COVID/RSV/Flu negative.    Patient currently stable, [facility] for serial cardiac enzymes and further ischemic work-up.          ***Prior TTE 11/10/23: normal LV size/systolic function, EF:60%. Normal RV size and systolic function. Mild AR, no pulm HTN.     89 yr old M with PMHx of HTN, hyperlipidemia, DM, BPH, CAD s/p multiple PCIs (most recently in 2015@[facility]), who presents to [facility] ED 10/19/24 c/o acutely worsening SOB x 1 day. Patient reports_____        In ED, BP: 172/73, HR: 50s, RR:18, Temp: 97.7F, O2 sat: 97% RA. EKG revealed Sinus bradycardia@50BPM without acute ST/T wave changes. CXR with mild pulmonary vascular congestion. Labs notable for: Trop T 53, CK/CKMB negative, BNP 2654, COVID/RSV/Flu negative. Patient treated with ASA 325mg PO x 1 dose.    Patient currently stable, admitted to [facility] cardiac telemetry for management of acute decompensated CHF in setting of ACS.          ***Prior TTE 11/10/23: normal LV size/systolic function, EF:60%. Normal RV size and systolic function. Mild AR, no pulm HTN.     89 yr old M with PMHx of HTN, hyperlipidemia, DM, BPH, CAD s/p multiple PCIs (most recently in 2015@[facility]), who presents to [facility] ED 10/19/24 c/o acutely worsening SOB x 2 days. Patient reports he has been experiencing FLORES upon ambulating >1 city block or performing his ADLs. Patient further admits to orthopnea. Patient denies any chest pain, dizziness, palpitations, LE edema or prior hx of CHF.    In ED, BP: 172/73, HR: 50s, RR:18, Temp: 97.7F, O2 sat: 97% RA. EKG revealed Sinus bradycardia@50BPM without acute ST/T wave changes. CXR with mild pulmonary vascular congestion. Labs notable for: Trop T 53, CK/CKMB negative, BNP 2654, COVID/RSV/Flu negative. Patient treated with ASA 325mg PO x 1 dose.    Patient currently stable, admitted to [facility] cardiac telemetry for management of acute decompensated CHF in setting of ACS.          ***Prior TTE 11/10/23: normal LV size/systolic function, EF:60%. Normal RV size and systolic function. Mild AR, no pulm HTN.     89 yr old M with PMHx of HTN, hyperlipidemia, DM, BPH, CAD s/p multiple PCIs (most recently in 2015@[facility]), who presents to [facility] ED 10/19/24 c/o acutely worsening SOB x 2 days. Patient reports he has been experiencing FLORES upon ambulating >1 city block or performing his ADLs since returning from his trip from [place]. Patient also reports 2 pillow orthopnea. Patient denies any chest pain, dizziness, palpitations, LE edema or prior hx of CHF.    In ED, BP: 172/73, HR: 50s, RR:18, Temp: 97.7F, O2 sat: 97% RA. EKG revealed Sinus bradycardia@50BPM without acute ST/T wave changes. CXR with mild pulmonary vascular congestion. Labs notable for: Trop T 53, CK/CKMB negative, BNP 2654, COVID/RSV/Flu negative. Patient treated with ASA 325mg PO x 1 dose.    Patient currently stable, [facility] management of acute decompensated CHF in setting of ACS.          ***Prior TTE 11/10/23: normal LV size/systolic function, EF:60%. Normal RV size and systolic function. Mild AR, no pulm HTN.

## 2024-10-19 NOTE — PATIENT PROFILE ADULT - BILL PAYMENT
Department of Anesthesiology  Postprocedure Note    Patient: Jermaine Polk  MRN: [id number]  YOB: 1978  Date of evaluation: 6/5/2019  Time:  1:35 PM     Procedure Summary     Date:  06/05/19 Room / Location:  [place] [address] / [facility] OR    Anesthesia Start:  1054 Anesthesia Stop:  1128    Procedure:  PORT INSERTION (N/A Chest) Diagnosis:  (MALIGNANT NEOPLASM RIGHT BREAST)    Surgeon:  Garrison Walls MD Responsible Provider:  Shelly Moseley MD    Anesthesia Type:  MAC ASA Status:  2          Anesthesia Type: MAC    Ashok Phase I: Ashok Score: 10    Ashok Phase II: Ashok Score: 10    Last vitals: Reviewed and per EMR flowsheets.        Anesthesia Post Evaluation    Comments: Postoperative Anesthesia Note    Name:    Jermaine Polk  MRN:      [id number]    Patient Vitals in the past 12 hrs:  06/05/19 1200, BP:113/73, Pulse:98, Resp:16, SpO2:96 %  06/05/19 1125, BP:106/69, Temp:97.6 °F (36.4 °C), Temp src:Temporal, Pulse:102, Resp:14, SpO2:94 %  06/05/19 0926, BP:125/84, Temp:97.8 °F (36.6 °C), Temp src:Temporal, Pulse:105, Resp:18, SpO2:97 %, Height:5' 3\" (1.6 m), Weight:135 lb (61.2 kg)     LABS:    CBC  Lab Results       Component                Value               Date/Time                  WBC                      8.5                 05/29/2019 05:00 PM        HGB                      10.1 (L)            05/29/2019 05:00 PM        HCT                      32.6 (L)            05/29/2019 05:00 PM        PLT                      364                 05/29/2019 05:00 PM   RENAL  Lab Results       Component                Value               Date/Time                  NA                       133 (L)             05/29/2019 05:00 PM        K                        4.5                 05/29/2019 05:00 PM        CL                       96 (L)              05/29/2019 05:00 PM        CO2                      23                  05/29/2019 05:00 PM        BUN                      6 (L) 05/29/2019 05:00 PM        CREATININE               0.6                 05/29/2019 05:00 PM        GLUCOSE                  87                  05/29/2019 05:00 PM   COAGS  No results found for: PROTIME, INR, APTT    Intake & Output: In: 620 (P.O.:120; I.V.:500)  Out: -     Nausea & Vomiting:  No    Level of Consciousness:  Awake    Pain Assessment:  Adequate analgesia    Anesthesia Complications:  No apparent anesthetic complications    SUMMARY      Vital signs stable  OK to discharge from Stage I post anesthesia care.   Care transferred from Anesthesiology department on discharge from perioperative area no

## 2024-10-19 NOTE — ED ADULT TRIAGE NOTE - ESI TRIAGE ACUITY LEVEL, MLM
2
How Severe Are Your Spot(S)?: moderate
What Type Of Note Output Would You Prefer (Optional)?: Standard Output
What Is The Reason For Today's Visit?: Full Body Skin Examination
What Is The Reason For Today's Visit? (Being Monitored For X): concerning skin lesions on an annual basis

## 2024-10-19 NOTE — H&P ADULT - CARDIOVASCULAR
regular rate and rhythm/S1 S2 present regular rate and rhythm/S1 S2 present/no murmur/no JVD regular rate and rhythm/S1 S2 present/no murmur regular rate and rhythm/S1 S2 present/no murmur/pedal edema regular rate and rhythm/S1 S2 present/no murmur/pedal edema/vascular

## 2024-10-19 NOTE — ED PROVIDER NOTE - PHYSICAL EXAMINATION
Constitutional: Well appearing, awake, alert, oriented to person, place, time/situation and in no apparent distress.  ENMT: Airway patent. Normal MM  Eyes: Clear bilaterally  Cardiac: Normal rate, regular rhythm.  Heart sounds S1, S2.  No murmurs, rubs or gallops. No JVD or LE edema  Respiratory: Breaths sounds equal and clear b/l. Faint scattered wheeze. Good aeration. No R/R. No increased WOB, tachypnea, hypoxia, or accessory mm use. Pt speaks in full sentences.   Gastrointestinal: Abd soft, NT, ND, NABS. No guarding, rebound, or rigidity. No pulsatile abdominal masses. No organomegaly appreciated.   Musculoskeletal: Range of motion is not limited. legs symmetric in size. no calf ttp  Neuro: Alert and oriented x 3, face symmetric and speech fluent. Strength 5/5 x 4 ext and symmetric, nml gross motor movement, nml gait. No focal deficits noted.  Skin: Skin normal color for race, warm, dry and intact. No evidence of rash.  Psych: Alert and oriented to person, place, time/situation. normal mood and affect. no apparent risk to self or others.

## 2024-10-19 NOTE — H&P ADULT - NSICDXPASTMEDICALHX_GEN_ALL_CORE_FT
PAST MEDICAL HISTORY:  Acute myocardial infarction Myocardial infarction    Atherosclerosis of coronary artery CAD (coronary artery disease)    Benign prostatic hypertrophy without lower urinary tract symptoms BPH (benign prostatic hyperplasia)    Calculus of kidney Kidney stone    Essential hypertension HTN (hypertension)

## 2024-10-19 NOTE — ED PROVIDER NOTE - OTHER RECORDS SUMMARY FREE TEXT FOR MDM OBTAINED AND REVIEWED OLD RECORDS QUESTION
Echo 11/10/2023 CONCLUSIONS:  Normal left ventricular size and systolic function. Normal right ventricular size and systolic function. Aortic sclerosis without significant stenosis. Mild aortic regurgitation. No evidence of pulmonary hypertension, pulmonary artery systolic   pressure is 30 mmHg. No pericardial effusion. Compared to the previous TTE performed on 12/3/2020, there have been no significant interval changes.

## 2024-10-19 NOTE — H&P ADULT - NSHPOUTPATIENTPROVIDERS_GEN_ALL_CORE
Cardiologist- Dr. Nice, Renal- Dr. Abdullahi, Urologist- Dr Hu ([facility]) Cardiologist- Dr. Nice, Renal- Dr. Abdullahi, Urologist- Dr Jose Sánchez ([facility])

## 2024-10-19 NOTE — H&P ADULT - NS ATTEND AMEND GEN_ALL_CORE FT
89 yo man PMHx of CAD s/p PCI (last 2015), DM, and HTN who was admitted for dyspnea on exertion and mild intermittent chest discomfort.     Assessment  1. CAD s/p PCI  HsTrop elevated 53->48->61, mild chest discomfort, normal Cr  2. Dyspnea on exertion - mildly decompensated on exam  3. HTN    Plan  1. Will treat as NSTEMI, hep gtt x 48hr or until LHC, ASA loading dose given  2. ASA 81mg PO QD and high intensity statin  3. Lasix 40mg IV given 10/19, will hold today given mild LAUREN  4. TTE  5. LHC Monday (outpatient cardiologist Dr. Nice said he will reach out to IC of his choice)    During non face-to-face time, I reviewed relevant portions of the patient’s medical record. During face-to-face time, I took a relevant history and examined the patient. I also explained differential diagnoses, relevant cardiac diagnoses, workup, and management plan, which required a high level of medical decision making. I answered all questions related to the patient's medical conditions.     Natacha Abbasi M.D.  Attending Cardiologist  [facility]

## 2024-10-19 NOTE — ED ADULT NURSE NOTE - CAS EDP DISCH DISPOSITION ADMI
Spoke with patient about scheduling a Colonoscopy. Patient stated that she already had procedure done somewhere else, she refused procedure.  
Telemetry

## 2024-10-19 NOTE — H&P ADULT - PROBLEM SELECTOR PLAN 4
continue HOME MED: Flomax 0.4mg PO daily.      N: DASH with consistent carb, 1.5 liter fluid restriction  E: Maintain K>4.0 and Mg>2.0  VTE PPx: Heparin gtt  Code: Full

## 2024-10-19 NOTE — PATIENT PROFILE ADULT - FALL HARM RISK - HARM RISK INTERVENTIONS

## 2024-10-19 NOTE — ED PROVIDER NOTE - OBJECTIVE STATEMENT
Pt w/ PMHx HTN, HLD, CAD s/p PCI, BPH, gout p/w SOB x 2 days    Meds:   Morning - Isosorbide Mom 30 mg ED, Flomax 0.4mg, Metoprolol Tartrate 50 mg ER, Finasteride 5mg  Afternoon - Pepcid 20 mg   Evening - Allopurinol 300 mg, ASA 81 mg, Lipitor 40 mg, Metoprolol Tartrate 25mg ER Pt w/ PMHx HTN, HLD, CAD s/p PCI, BPH, gout p/w SOB x 2 days, noted on exertion. Pt denies, but wife notes pt looks notably FLORES. NO CP, f/c, cough. + mild nasal congestion. No orthopnea, PND, LE edema, nor calf pain.   Last angiogram approx 10-15 years ago. Cardiologist Dr Nice    Meds:   Morning - Isosorbide Mom 30 mg ED, Flomax 0.4mg, Metoprolol Tartrate 50 mg ER, Finasteride 5mg  Afternoon - Pepcid 20 mg   Evening - Allopurinol 300 mg, ASA 81 mg, Lipitor 40 mg, Metoprolol Tartrate 25mg ER

## 2024-10-19 NOTE — ED PROVIDER NOTE - CLINICAL SUMMARY MEDICAL DECISION MAKING FREE TEXT BOX
Pt p/w FLORES. Hx CAD s/p PCI. No known hx CHF, echo last year wnl. NO tachypnea, hypoxia, or tachycardia to suggest PE. No signs of DVT. DDx includes but not limited to anginal equivalent/ACS, URI, PNA, less likely decompensated CHF, other pathology. There are no EKG changes to suggest ischemia, infarction, or pericarditis. H&P is not c/w dissection. No acute findings on CXR. Elevated troponin w/ high Heart score. D/w pt's cardiologist Dr Nice. Requesting admission to cardio for echo, possible angiogram

## 2024-10-20 DIAGNOSIS — E11.9 TYPE 2 DIABETES MELLITUS WITHOUT COMPLICATIONS: ICD-10-CM

## 2024-10-20 DIAGNOSIS — N17.9 ACUTE KIDNEY FAILURE, UNSPECIFIED: ICD-10-CM

## 2024-10-20 DIAGNOSIS — I20.0 UNSTABLE ANGINA: ICD-10-CM

## 2024-10-20 DIAGNOSIS — E87.70 FLUID OVERLOAD, UNSPECIFIED: ICD-10-CM

## 2024-10-20 LAB
A1C WITH ESTIMATED AVERAGE GLUCOSE RESULT: 6.8 % — HIGH (ref 4–5.6)
ANION GAP SERPL CALC-SCNC: 5 MMOL/L — SIGNIFICANT CHANGE UP (ref 5–17)
APTT BLD: 51.4 SEC — HIGH (ref 24.5–35.6)
APTT BLD: 64.3 SEC — HIGH (ref 24.5–35.6)
APTT BLD: 65.6 SEC — HIGH (ref 24.5–35.6)
APTT BLD: 78.9 SEC — HIGH (ref 24.5–35.6)
BUN SERPL-MCNC: 27 MG/DL — HIGH (ref 7–23)
CALCIUM SERPL-MCNC: 9.2 MG/DL — SIGNIFICANT CHANGE UP (ref 8.4–10.5)
CHLORIDE SERPL-SCNC: 104 MMOL/L — SIGNIFICANT CHANGE UP (ref 96–108)
CHOLEST SERPL-MCNC: 102 MG/DL — SIGNIFICANT CHANGE UP
CK MB CFR SERPL CALC: 2.7 NG/ML — SIGNIFICANT CHANGE UP (ref 0–6.7)
CK MB CFR SERPL CALC: 3.3 NG/ML — SIGNIFICANT CHANGE UP (ref 0–6.7)
CK SERPL-CCNC: 62 U/L — SIGNIFICANT CHANGE UP (ref 30–200)
CK SERPL-CCNC: 72 U/L — SIGNIFICANT CHANGE UP (ref 30–200)
CO2 SERPL-SCNC: 34 MMOL/L — HIGH (ref 22–31)
CREAT SERPL-MCNC: 1.51 MG/DL — HIGH (ref 0.5–1.3)
EGFR: 44 ML/MIN/1.73M2 — LOW
ESTIMATED AVERAGE GLUCOSE: 148 MG/DL — HIGH (ref 68–114)
GLUCOSE BLDC GLUCOMTR-MCNC: 145 MG/DL — HIGH (ref 70–99)
GLUCOSE BLDC GLUCOMTR-MCNC: 164 MG/DL — HIGH (ref 70–99)
GLUCOSE BLDC GLUCOMTR-MCNC: 215 MG/DL — HIGH (ref 70–99)
GLUCOSE SERPL-MCNC: 141 MG/DL — HIGH (ref 70–99)
HCT VFR BLD CALC: 36.8 % — LOW (ref 39–50)
HCT VFR BLD CALC: 38.3 % — LOW (ref 39–50)
HDLC SERPL-MCNC: 58 MG/DL — SIGNIFICANT CHANGE UP
HGB BLD-MCNC: 12 G/DL — LOW (ref 13–17)
HGB BLD-MCNC: 12.3 G/DL — LOW (ref 13–17)
LIPID PNL WITH DIRECT LDL SERPL: 34 MG/DL — SIGNIFICANT CHANGE UP
MAGNESIUM SERPL-MCNC: 2.1 MG/DL — SIGNIFICANT CHANGE UP (ref 1.6–2.6)
MCHC RBC-ENTMCNC: 29.5 PG — SIGNIFICANT CHANGE UP (ref 27–34)
MCHC RBC-ENTMCNC: 31.1 PG — SIGNIFICANT CHANGE UP (ref 27–34)
MCHC RBC-ENTMCNC: 32.1 GM/DL — SIGNIFICANT CHANGE UP (ref 32–36)
MCHC RBC-ENTMCNC: 32.6 GM/DL — SIGNIFICANT CHANGE UP (ref 32–36)
MCV RBC AUTO: 91.8 FL — SIGNIFICANT CHANGE UP (ref 80–100)
MCV RBC AUTO: 95.3 FL — SIGNIFICANT CHANGE UP (ref 80–100)
NON HDL CHOLESTEROL: 44 MG/DL — SIGNIFICANT CHANGE UP
NRBC # BLD: 0 /100 WBCS — SIGNIFICANT CHANGE UP (ref 0–0)
NRBC # BLD: 0 /100 WBCS — SIGNIFICANT CHANGE UP (ref 0–0)
PLATELET # BLD AUTO: 115 K/UL — LOW (ref 150–400)
PLATELET # BLD AUTO: 132 K/UL — LOW (ref 150–400)
POTASSIUM SERPL-MCNC: 4.2 MMOL/L — SIGNIFICANT CHANGE UP (ref 3.5–5.3)
POTASSIUM SERPL-SCNC: 4.2 MMOL/L — SIGNIFICANT CHANGE UP (ref 3.5–5.3)
RBC # BLD: 3.86 M/UL — LOW (ref 4.2–5.8)
RBC # BLD: 4.17 M/UL — LOW (ref 4.2–5.8)
RBC # FLD: 13.1 % — SIGNIFICANT CHANGE UP (ref 10.3–14.5)
RBC # FLD: 13.2 % — SIGNIFICANT CHANGE UP (ref 10.3–14.5)
SODIUM SERPL-SCNC: 143 MMOL/L — SIGNIFICANT CHANGE UP (ref 135–145)
TRIGL SERPL-MCNC: 36 MG/DL — SIGNIFICANT CHANGE UP
TROPONIN T, HIGH SENSITIVITY RESULT: 54 NG/L — CRITICAL HIGH (ref 0–51)
TROPONIN T, HIGH SENSITIVITY RESULT: 61 NG/L — CRITICAL HIGH (ref 0–51)
WBC # BLD: 5.27 K/UL — SIGNIFICANT CHANGE UP (ref 3.8–10.5)
WBC # BLD: 6.01 K/UL — SIGNIFICANT CHANGE UP (ref 3.8–10.5)
WBC # FLD AUTO: 5.27 K/UL — SIGNIFICANT CHANGE UP (ref 3.8–10.5)
WBC # FLD AUTO: 6.01 K/UL — SIGNIFICANT CHANGE UP (ref 3.8–10.5)

## 2024-10-20 PROCEDURE — 99233 SBSQ HOSP IP/OBS HIGH 50: CPT

## 2024-10-20 RX ORDER — GLUCAGON INJECTION, SOLUTION 1 MG/.2ML
1 INJECTION, SOLUTION SUBCUTANEOUS ONCE
Refills: 0 | Status: DISCONTINUED | OUTPATIENT
Start: 2024-10-20 | End: 2024-10-22

## 2024-10-20 RX ORDER — HEPARIN SODIUM 10000 [USP'U]/ML
850 INJECTION INTRAVENOUS; SUBCUTANEOUS
Qty: 25000 | Refills: 0 | Status: DISCONTINUED | OUTPATIENT
Start: 2024-10-20 | End: 2024-10-21

## 2024-10-20 RX ORDER — ISOSORBIDE MONONITRATE 60 MG/1
60 TABLET, EXTENDED RELEASE ORAL DAILY
Refills: 0 | Status: DISCONTINUED | OUTPATIENT
Start: 2024-10-21 | End: 2024-10-21

## 2024-10-20 RX ORDER — SODIUM CHLORIDE 9 MG/ML
1000 INJECTION, SOLUTION INTRAMUSCULAR; INTRAVENOUS; SUBCUTANEOUS
Refills: 0 | Status: DISCONTINUED | OUTPATIENT
Start: 2024-10-20 | End: 2024-10-20

## 2024-10-20 RX ORDER — NIFEDIPINE 90 MG
30 TABLET, EXTENDED RELEASE 24 HR ORAL ONCE
Refills: 0 | Status: COMPLETED | OUTPATIENT
Start: 2024-10-20 | End: 2024-10-20

## 2024-10-20 RX ORDER — CHLORHEXIDINE GLUCONATE 40 MG/ML
1 SOLUTION TOPICAL ONCE
Refills: 0 | Status: DISCONTINUED | OUTPATIENT
Start: 2024-10-20 | End: 2024-10-22

## 2024-10-20 RX ORDER — POLYETHYLENE GLYCOL 3350 17 G/17G
17 POWDER, FOR SOLUTION ORAL DAILY
Refills: 0 | Status: DISCONTINUED | OUTPATIENT
Start: 2024-10-20 | End: 2024-10-22

## 2024-10-20 RX ORDER — HYDRALAZINE HYDROCHLORIDE 50 MG/1
5 TABLET, FILM COATED ORAL ONCE
Refills: 0 | Status: COMPLETED | OUTPATIENT
Start: 2024-10-20 | End: 2024-10-20

## 2024-10-20 RX ORDER — ISOSORBIDE MONONITRATE 60 MG/1
30 TABLET, EXTENDED RELEASE ORAL ONCE
Refills: 0 | Status: COMPLETED | OUTPATIENT
Start: 2024-10-20 | End: 2024-10-20

## 2024-10-20 RX ORDER — INSULIN LISPRO 100/ML
VIAL (ML) SUBCUTANEOUS
Refills: 0 | Status: DISCONTINUED | OUTPATIENT
Start: 2024-10-20 | End: 2024-10-22

## 2024-10-20 RX ADMIN — Medication 1: at 23:01

## 2024-10-20 RX ADMIN — HEPARIN SODIUM 800 UNIT(S)/HR: 10000 INJECTION INTRAVENOUS; SUBCUTANEOUS at 09:34

## 2024-10-20 RX ADMIN — HEPARIN SODIUM 850 UNIT(S)/HR: 10000 INJECTION INTRAVENOUS; SUBCUTANEOUS at 03:33

## 2024-10-20 RX ADMIN — Medication 300 MILLIGRAM(S): at 09:13

## 2024-10-20 RX ADMIN — Medication 50 MILLIGRAM(S): at 15:51

## 2024-10-20 RX ADMIN — ISOSORBIDE MONONITRATE 30 MILLIGRAM(S): 60 TABLET, EXTENDED RELEASE ORAL at 17:16

## 2024-10-20 RX ADMIN — HYDRALAZINE HYDROCHLORIDE 5 MILLIGRAM(S): 50 TABLET, FILM COATED ORAL at 21:50

## 2024-10-20 RX ADMIN — FAMOTIDINE 20 MILLIGRAM(S): 10 INJECTION INTRAVENOUS at 09:13

## 2024-10-20 RX ADMIN — Medication 40 MILLIGRAM(S): at 21:50

## 2024-10-20 RX ADMIN — HEPARIN SODIUM 850 UNIT(S)/HR: 10000 INJECTION INTRAVENOUS; SUBCUTANEOUS at 08:24

## 2024-10-20 RX ADMIN — Medication 25 MILLIGRAM(S): at 21:51

## 2024-10-20 RX ADMIN — HEPARIN SODIUM 800 UNIT(S)/HR: 10000 INJECTION INTRAVENOUS; SUBCUTANEOUS at 20:26

## 2024-10-20 RX ADMIN — HEPARIN SODIUM 800 UNIT(S)/HR: 10000 INJECTION INTRAVENOUS; SUBCUTANEOUS at 15:46

## 2024-10-20 RX ADMIN — Medication 2: at 12:16

## 2024-10-20 RX ADMIN — Medication 81 MILLIGRAM(S): at 09:13

## 2024-10-20 RX ADMIN — ISOSORBIDE MONONITRATE 30 MILLIGRAM(S): 60 TABLET, EXTENDED RELEASE ORAL at 09:14

## 2024-10-20 RX ADMIN — Medication 0.4 MILLIGRAM(S): at 21:50

## 2024-10-20 RX ADMIN — HYDRALAZINE HYDROCHLORIDE 5 MILLIGRAM(S): 50 TABLET, FILM COATED ORAL at 19:18

## 2024-10-20 RX ADMIN — HEPARIN SODIUM 800 UNIT(S)/HR: 10000 INJECTION INTRAVENOUS; SUBCUTANEOUS at 22:21

## 2024-10-20 RX ADMIN — Medication 30 MILLIGRAM(S): at 21:51

## 2024-10-20 RX ADMIN — POLYETHYLENE GLYCOL 3350 17 GRAM(S): 17 POWDER, FOR SOLUTION ORAL at 19:18

## 2024-10-20 NOTE — PROGRESS NOTE ADULT - NS ATTEND AMEND GEN_ALL_CORE FT
89 yo man PMHx of CAD s/p PCI (last 2015), DM, and HTN who was admitted for dyspnea on exertion and mild intermittent chest discomfort.     Assessment  1. CAD s/p PCI  HsTrop elevated 53->48->61, mild chest discomfort, normal Cr  2. Dyspnea on exertion - mildly decompensated on exam  3. HTN    Plan  1. Will treat as NSTEMI, hep gtt x 48hr or until LHC, ASA loading dose given  2. ASA 81mg PO QD and high intensity statin  3. Lasix 40mg IV given 10/19, will hold today given mild LAUREN  4. TTE  5. LHC Monday (outpatient cardiologist Dr. Nice said he will reach out to IC of his choice)    During non face-to-face time, I reviewed relevant portions of the patient’s medical record. During face-to-face time, I took a relevant history and examined the patient. I also explained differential diagnoses, relevant cardiac diagnoses, workup, and management plan, which required a high level of medical decision making. I answered all questions related to the patient's medical conditions.     Natacha Abbasi M.D.  Attending Cardiologist  [facility]

## 2024-10-20 NOTE — PROGRESS NOTE ADULT - PROBLEM SELECTOR PLAN 4
continue HOME MED: Flomax 0.4mg PO daily.      N: DASH with consistent carb, 1.5 liter fluid restriction  E: Maintain K>4.0 and Mg>2.0  VTE PPx: Heparin gtt  Code: Full c/w HOME MEDS: Metoprolol Tartrate 50mg PO daily and 25mg PO qhs and Imdur ER 30mg PO daily.

## 2024-10-20 NOTE — PROVIDER CONTACT NOTE (CRITICAL VALUE NOTIFICATION) - ASSESSMENT
Elevated systolic BP to 160s, otherwise VSS, see VS flowsheet. Pt endorses 4/10 L- sided chest "discomfort" which started when he woke up this AM. Denies SOB, palpitations.

## 2024-10-20 NOTE — PROGRESS NOTE ADULT - PROBLEM SELECTOR PLAN 1
+faint bibasilar crackles, trace pedal edema bilaterally, satting 95% on RA.  - CXR with mild pulmonary vascular congestion, BNP 2654.  - TTE 11/2023: EF 60%; mild AR; otherwise normal     o pending repeat TTE  - s/p Lasix 40mg IVP x1, negative 2.3L / 24hrs     o now dry on exam; Precath IVF 50cc x 12hrs to start at 8pm tonight  - GDMT: continue Isosorbide Mononitrate 30mg PO daily and BB regimen. Trop T 53 -> 48 ->61; normal CK/CKMB  - f/u repeat CE at 3pm  - s/p ASA 325mg load; c/w ASA 81mg qd  - c/w heparin gtt x48hrs  - plan for [facility] 10/21, NPO @[place]; consent in chart    o start IVF 50cc x 12hrs at 8pm tonight

## 2024-10-20 NOTE — PROGRESS NOTE ADULT - PROBLEM SELECTOR PLAN 3
will resume HOME MEDS: Metoprolol Tartrate 50mg PO daily and 25mg PO qhs and Imdur ER 30mg PO daily. hx CKD - follows with Dr Abdullahi, unclear baseline  - Cr 1.51 10/20  - IVF as above, f/u AM Cr

## 2024-10-20 NOTE — PROGRESS NOTE ADULT - ASSESSMENT
89yo M PMHx HTN, HLD, DM2, BPH, CAD (s/p multiple PCIs, most recently in 2015 @[facility]) admitted for unstable angina after c/o worseing SOB x1 day. Trop T + without EKG changes. S/p Lasix 40mg IVP x1, started on heparin gtt. Plan for cardiac cath 10/21/24. 89yo M PMHx HTN, HLD, DM2, BPH, CAD (s/p multiple PCIs, most recently in 2015 @[facility]) admitted for unstable angina after c/o worsening SOB x1 day. Trop T + without EKG changes. S/p Lasix 40mg IVP x1, started on heparin gtt. Plan for cardiac cath 10/21/24.

## 2024-10-20 NOTE — PROGRESS NOTE ADULT - PROBLEM SELECTOR PLAN 2
Trop T 53, CK/CKMB negative.  --will continue to trend CE.  --Patient received ASA 325mg PO x 1 dose in ED. Initiating Heparin gtt per ACS protocol x 48 hours.  --tentative plan for possible cardiac catheterization on Monday 10/21/24 as recommended by outpatient cardiologist Dr. Nice. +faint bibasilar crackles, trace pedal edema bilaterally, sating 95% on RA.  - CXR with mild pulmonary vascular congestion, BNP 2654.  - TTE 11/2023: EF 60%; mild AR; otherwise normal     o pending repeat TTE  - s/p Lasix 40mg IVP x1, negative 2.3L / 24hrs; no further diuretic needed

## 2024-10-20 NOTE — PROVIDER CONTACT NOTE (CRITICAL VALUE NOTIFICATION) - ACTION/TREATMENT ORDERED:
CASSIE Lozano assessing pt at bedside. Ordered to give PO imdur now and continue heparin drip. No further intervention required at this time.
No

## 2024-10-20 NOTE — PROGRESS NOTE ADULT - SUBJECTIVE AND OBJECTIVE BOX
Interventional Cardiology PA Adult Progress Note    Subjective Assessment: Seen and examined bedside. Denies chest pain, palpitations, SOB, orthopnea/PND, dizziness, LOC, n/v/d, fever/chills/sick contacts, LE edema.     ROS negative except as noted above.  	  MEDICATIONS:  isosorbide   mononitrate ER Tablet (IMDUR) 30 milliGRAM(s) Oral daily  metoprolol tartrate 50 milliGRAM(s) Oral daily  metoprolol tartrate 25 milliGRAM(s) Oral at bedtime  famotidine    Tablet 20 milliGRAM(s) Oral daily  allopurinol 300 milliGRAM(s) Oral daily  atorvastatin 40 milliGRAM(s) Oral at bedtime  aspirin enteric coated 81 milliGRAM(s) Oral daily  chlorhexidine 4% Liquid 1 Application(s) Topical once  heparin   Injectable 4100 Unit(s) IV Push every 6 hours PRN  heparin  Infusion. 850 Unit(s)/Hr IV Continuous <Continuous>  sodium chloride 0.9%. 1000 milliLiter(s) IV Continuous <Continuous>  tamsulosin 0.4 milliGRAM(s) Oral at bedtime      	    PHYSICAL EXAM:  TELEMETRY:  T(C): 36.6 (10-20-24 @ 08:33), Max: 36.6 (10-19-24 @ 19:14)  HR: 62 (10-20-24 @ 08:33) (49 - 62)  BP: 163/66 (10-20-24 @ 08:33) (155/69 - 185/84)  RR: 17 (10-20-24 @ 08:33) (16 - 20)  SpO2: 94% (10-20-24 @ 08:33) (94% - 97%)  Wt(kg): --  I&O's Summary    19 Oct 2024 07:01  -  20 Oct 2024 07:00  --------------------------------------------------------  IN: 0 mL / OUT: 2570 mL / NET: -2570 mL    20 Oct 2024 07:01  -  20 Oct 2024 11:23  --------------------------------------------------------  IN: 180 mL / OUT: 0 mL / NET: 180 mL      Height (cm): 179.1 (10-19 @ 19:14)  Weight (kg): 69.2 (10-19 @ 19:14)  BMI (kg/m2): 21.6 (10-19 @ 19:14)  BSA (m2): 1.87 (10-19 @ 19:14)                                        Appearance: Normal	  HEENT:   Normal oral mucosa, PERRLA, EOMI	  Neck: Supple,- JVD; Carotid Bruit   Cardiovascular: Normal S1 S2, No JVD, No murmurs,   Respiratory: Lungs clear to auscultation, No Rales, Rhonchi, Wheezing	  Gastrointestinal:  Soft, Non-tender, + BS	  Skin: No rashes, No ecchymoses, No cyanosis  Extremities: Normal range of motion, No clubbing, cyanosis or edema  Vascular: Peripheral pulses palpable 2+ bilaterally  Neurologic: Non-focal  Psychiatry: A & O x 3, Mood & affect appropriate               12.0   5.27  )-----------( 115      ( 20 Oct 2024 07:52 )             36.8     10-20    143  |  104  |  27[H]  ----------------------------<  141[H]  4.2   |  34[H]  |  1.51[H]    Ca    9.2      20 Oct 2024 07:52  Mg     2.1     10-20  PT/INR - ( 19 Oct 2024 16:57 )   PT: 12.1 sec;   INR: 1.05          PTT - ( 20 Oct 2024 09:05 )  PTT:78.9 sec

## 2024-10-21 ENCOUNTER — RESULT REVIEW (OUTPATIENT)
Age: 89
End: 2024-10-21

## 2024-10-21 DIAGNOSIS — R41.0 DISORIENTATION, UNSPECIFIED: ICD-10-CM

## 2024-10-21 LAB
ALBUMIN SERPL ELPH-MCNC: 3.8 G/DL — SIGNIFICANT CHANGE UP (ref 3.3–5)
ALP SERPL-CCNC: 81 U/L — SIGNIFICANT CHANGE UP (ref 40–120)
ALT FLD-CCNC: 44 U/L — SIGNIFICANT CHANGE UP (ref 10–45)
ANION GAP SERPL CALC-SCNC: 10 MMOL/L — SIGNIFICANT CHANGE UP (ref 5–17)
APTT BLD: 27 SEC — SIGNIFICANT CHANGE UP (ref 24.5–35.6)
APTT BLD: 54.2 SEC — HIGH (ref 24.5–35.6)
AST SERPL-CCNC: 35 U/L — SIGNIFICANT CHANGE UP (ref 10–40)
BILIRUB SERPL-MCNC: 0.8 MG/DL — SIGNIFICANT CHANGE UP (ref 0.2–1.2)
BUN SERPL-MCNC: 33 MG/DL — HIGH (ref 7–23)
CALCIUM SERPL-MCNC: 9.5 MG/DL — SIGNIFICANT CHANGE UP (ref 8.4–10.5)
CHLORIDE SERPL-SCNC: 102 MMOL/L — SIGNIFICANT CHANGE UP (ref 96–108)
CO2 SERPL-SCNC: 27 MMOL/L — SIGNIFICANT CHANGE UP (ref 22–31)
CREAT SERPL-MCNC: 1.51 MG/DL — HIGH (ref 0.5–1.3)
EGFR: 44 ML/MIN/1.73M2 — LOW
GLUCOSE BLDC GLUCOMTR-MCNC: 140 MG/DL — HIGH (ref 70–99)
GLUCOSE BLDC GLUCOMTR-MCNC: 155 MG/DL — HIGH (ref 70–99)
GLUCOSE BLDC GLUCOMTR-MCNC: 172 MG/DL — HIGH (ref 70–99)
GLUCOSE SERPL-MCNC: 140 MG/DL — HIGH (ref 70–99)
HCT VFR BLD CALC: 35.9 % — LOW (ref 39–50)
HCT VFR BLD CALC: 39.4 % — SIGNIFICANT CHANGE UP (ref 39–50)
HGB BLD-MCNC: 11.9 G/DL — LOW (ref 13–17)
HGB BLD-MCNC: 12.6 G/DL — LOW (ref 13–17)
MAGNESIUM SERPL-MCNC: 2.1 MG/DL — SIGNIFICANT CHANGE UP (ref 1.6–2.6)
MCHC RBC-ENTMCNC: 29.7 PG — SIGNIFICANT CHANGE UP (ref 27–34)
MCHC RBC-ENTMCNC: 31.1 PG — SIGNIFICANT CHANGE UP (ref 27–34)
MCHC RBC-ENTMCNC: 32 GM/DL — SIGNIFICANT CHANGE UP (ref 32–36)
MCHC RBC-ENTMCNC: 33.1 GM/DL — SIGNIFICANT CHANGE UP (ref 32–36)
MCV RBC AUTO: 92.9 FL — SIGNIFICANT CHANGE UP (ref 80–100)
MCV RBC AUTO: 93.7 FL — SIGNIFICANT CHANGE UP (ref 80–100)
NRBC # BLD: 0 /100 WBCS — SIGNIFICANT CHANGE UP (ref 0–0)
NRBC # BLD: 0 /100 WBCS — SIGNIFICANT CHANGE UP (ref 0–0)
PLATELET # BLD AUTO: 113 K/UL — LOW (ref 150–400)
PLATELET # BLD AUTO: 128 K/UL — LOW (ref 150–400)
POTASSIUM SERPL-MCNC: 4.1 MMOL/L — SIGNIFICANT CHANGE UP (ref 3.5–5.3)
POTASSIUM SERPL-SCNC: 4.1 MMOL/L — SIGNIFICANT CHANGE UP (ref 3.5–5.3)
PROT SERPL-MCNC: 6.6 G/DL — SIGNIFICANT CHANGE UP (ref 6–8.3)
RBC # BLD: 3.83 M/UL — LOW (ref 4.2–5.8)
RBC # BLD: 4.24 M/UL — SIGNIFICANT CHANGE UP (ref 4.2–5.8)
RBC # FLD: 13.3 % — SIGNIFICANT CHANGE UP (ref 10.3–14.5)
RBC # FLD: 13.5 % — SIGNIFICANT CHANGE UP (ref 10.3–14.5)
SODIUM SERPL-SCNC: 139 MMOL/L — SIGNIFICANT CHANGE UP (ref 135–145)
TSH SERPL-MCNC: 2.62 UIU/ML — SIGNIFICANT CHANGE UP (ref 0.27–4.2)
WBC # BLD: 4.42 K/UL — SIGNIFICANT CHANGE UP (ref 3.8–10.5)
WBC # BLD: 5.86 K/UL — SIGNIFICANT CHANGE UP (ref 3.8–10.5)
WBC # FLD AUTO: 4.42 K/UL — SIGNIFICANT CHANGE UP (ref 3.8–10.5)
WBC # FLD AUTO: 5.86 K/UL — SIGNIFICANT CHANGE UP (ref 3.8–10.5)

## 2024-10-21 PROCEDURE — 93306 TTE W/DOPPLER COMPLETE: CPT | Mod: 26

## 2024-10-21 PROCEDURE — 70450 CT HEAD/BRAIN W/O DYE: CPT | Mod: 26

## 2024-10-21 PROCEDURE — 99223 1ST HOSP IP/OBS HIGH 75: CPT

## 2024-10-21 PROCEDURE — 93010 ELECTROCARDIOGRAM REPORT: CPT

## 2024-10-21 RX ORDER — QUETIAPINE FUMARATE 200 MG/1
25 TABLET ORAL ONCE
Refills: 0 | Status: COMPLETED | OUTPATIENT
Start: 2024-10-21 | End: 2024-10-21

## 2024-10-21 RX ORDER — OLANZAPINE 20 MG/1
2.5 TABLET ORAL ONCE
Refills: 0 | Status: COMPLETED | OUTPATIENT
Start: 2024-10-21 | End: 2024-10-21

## 2024-10-21 RX ORDER — ISOSORBIDE MONONITRATE 60 MG/1
120 TABLET, EXTENDED RELEASE ORAL DAILY
Refills: 0 | Status: DISCONTINUED | OUTPATIENT
Start: 2024-10-21 | End: 2024-10-22

## 2024-10-21 RX ORDER — HEPARIN SODIUM 10000 [USP'U]/ML
INJECTION INTRAVENOUS; SUBCUTANEOUS
Qty: 25000 | Refills: 0 | Status: DISCONTINUED | OUTPATIENT
Start: 2024-10-21 | End: 2024-10-21

## 2024-10-21 RX ORDER — OLANZAPINE 20 MG/1
2.5 TABLET ORAL ONCE
Refills: 0 | Status: DISCONTINUED | OUTPATIENT
Start: 2024-10-21 | End: 2024-10-21

## 2024-10-21 RX ORDER — OLANZAPINE 20 MG/1
5 TABLET ORAL ONCE
Refills: 0 | Status: COMPLETED | OUTPATIENT
Start: 2024-10-21 | End: 2024-10-21

## 2024-10-21 RX ORDER — HEPARIN SODIUM 10000 [USP'U]/ML
4100 INJECTION INTRAVENOUS; SUBCUTANEOUS EVERY 6 HOURS
Refills: 0 | Status: DISCONTINUED | OUTPATIENT
Start: 2024-10-21 | End: 2024-10-21

## 2024-10-21 RX ADMIN — POLYETHYLENE GLYCOL 3350 17 GRAM(S): 17 POWDER, FOR SOLUTION ORAL at 11:16

## 2024-10-21 RX ADMIN — HEPARIN SODIUM 800 UNIT(S)/HR: 10000 INJECTION INTRAVENOUS; SUBCUTANEOUS at 11:13

## 2024-10-21 RX ADMIN — OLANZAPINE 5 MILLIGRAM(S): 20 TABLET ORAL at 20:53

## 2024-10-21 RX ADMIN — OLANZAPINE 2.5 MILLIGRAM(S): 20 TABLET ORAL at 03:43

## 2024-10-21 RX ADMIN — Medication 300 MILLIGRAM(S): at 11:15

## 2024-10-21 RX ADMIN — Medication 1: at 17:11

## 2024-10-21 RX ADMIN — QUETIAPINE FUMARATE 25 MILLIGRAM(S): 200 TABLET ORAL at 12:45

## 2024-10-21 RX ADMIN — FAMOTIDINE 20 MILLIGRAM(S): 10 INJECTION INTRAVENOUS at 11:15

## 2024-10-21 RX ADMIN — Medication 81 MILLIGRAM(S): at 11:14

## 2024-10-21 RX ADMIN — ISOSORBIDE MONONITRATE 120 MILLIGRAM(S): 60 TABLET, EXTENDED RELEASE ORAL at 11:14

## 2024-10-21 RX ADMIN — Medication 50 MILLIGRAM(S): at 08:03

## 2024-10-21 NOTE — PROGRESS NOTE ADULT - PROBLEM SELECTOR PLAN 1
Trop T 53 -> 48 ->61; normal CK/CKMB  - f/u repeat CE at 3pm  - s/p ASA 325mg load; c/w ASA 81mg qd  - c/w heparin gtt x48hrs  - plan for [facility] 10/21, NPO @[place]; consent in chart    o start IVF 50cc x 12hrs at 8pm tonight Currently Asx. Presented w/ SOB x 1 day.  - Trop T 53 -> 48 ->61; normal CK/CKMB in the setting of CKD  - s/p ASA 325mg load; c/w ASA 81mg qd  - c/w heparin gtt x 48 hrs  - Pending TTE +/- LHC if warranted

## 2024-10-21 NOTE — PROGRESS NOTE ADULT - PROBLEM SELECTOR PLAN 5
A1C 6.8%  - NICOLE -180s  -C/w metoprolol tartrate 50mg PO daily and 25mg PO qhs   -Inc Imdur to 90 mg PO daily

## 2024-10-21 NOTE — PROGRESS NOTE ADULT - PROBLEM SELECTOR PLAN 3
hx CKD - follows with Dr Abdullahi, unclear baseline  - Cr 1.51 10/20  - IVF as above, f/u AM Cr Intermittent episodes of delirium with agitation since hospital stay.   -S/p seroquel 25 mg po x 1   -CT brain pending  -Supportive care provided including encouraging mobility, reorienting patient, involving family & regulating sleep wake cycles

## 2024-10-21 NOTE — CONSULT NOTE ADULT - ASSESSMENT
89 yo M with a PMH of HTN, HLD, T2DM, BPH, CAD (s/p multiple PCIs, most recently in 2015), presented with one day of worsening shortness of breath thought to be representative of unstable angina. On admission with elevated troponin although no new ischemic EKG changes. Given IV diuresis and started on heparin drip, planned for catheterization on this hospitalization. Medicine consulted for comanagement.     #Unstable angina   #Pulmonary edema   -Troponin on admission elevated   -sp CXR on admission with mild pulmonary vascular congestion, given IV lasix 40 mg x1  -sp  mg, continue daily ASA 81   -continue heparin drip for total 48hrs   -[facility] planned on 10/22   -repeat TTE (last in 2023 without signs of HF, EF 60%)  -OK to give small amount of maintenance fluid prior to cath but would monitor volume exam closely     #CAD s/p multiple PCI, most recent in 2015  -Continue daily ASA as above   -Continue atorvastatin 40 mg daily     #LAUREN on CKD   -Baseline appears to be 1.2-1.3 range, currently 1.51 on AM of 10/21.   -Avoid nephrotoxins as able, renally dose all medications   -OK to give pre-cath hydration as above   -Given persistently 1.5, recommend urine urea, urine creat for further workup     #Severe HTN   -Continue home medications:   Metoprolol tartrate 50 mg in AM and 25 mg in PM   INCREASE imdur from 30 mg daily to 60 mg daily     #Thrombocytopenia   -Continue to monitor for now, stable   -Consider further workup with smear, 4T score if worsens     #T2DM   -ISS     #BPH   -Continue home tamsulosin 0.4 mg daily     DVT ppx: heparin gtt

## 2024-10-21 NOTE — CONSULT NOTE ADULT - REASON FOR ADMISSION
Pt. Arrives to ED via private auto for c/o lower abdominal pain. Pt states that she had a stent placed on 12/12/23 and has been having pain since then. Pt denies any chest pain, SOB, N/V/D or unusual swelling. Pt does state that she has not been able to eat due to no appetite because of her pain. Upon arrival pt has a slow but steady gait, is A&O X4, and speaking in clear sentences.
acute decompensated CHF in setting of ACS

## 2024-10-21 NOTE — PHARMACY COMMUNICATION NOTE - COMMENTS
Performed med rec with patient and his wife at bedside.    Local Pharmacy: [facility] [address] [place] [address]    1. Isosorbide Mononitrate 30 mg ER- take 1 tablet by mouth in the morning  2. Tamsulosin 0.4 mg- take 1 capsule by mouth in the morning  3. Metoprolol Tartrate 50 mg ER- take 1 tablet by mouth in the morning  4. Finasteride 5 mg- take 1 tablet by mouth in the morning  5. Allopurinol 300 mg- take 1 tablet by mouth in the evening  6. Aspirin 81mg EC- take 1 tablet by mouth in the evening  7. Lipitor 40 mg- take 1 tablet by mouth in the evening  8. Metoprolol Tartrate 25 mg ER- take 1 tablet by mouth in the evening

## 2024-10-21 NOTE — PROGRESS NOTE ADULT - PROBLEM SELECTOR PLAN 6
continue HOME MED: Flomax 0.4mg PO daily.      N: DASH with consistent carb, 1.5 liter fluid restriction  E: Maintain K>4.0 and Mg>2.0  VTE PPx: Heparin gtt  Code: Full A1C 6.8%  - NICOLE

## 2024-10-21 NOTE — PROGRESS NOTE ADULT - SUBJECTIVE AND OBJECTIVE BOX
Cardiology PA Adult Progress Note    SUBJECTIVE ASSESSMENT:  	  MEDICATIONS:  isosorbide   mononitrate ER Tablet (IMDUR) 60 milliGRAM(s) Oral daily  metoprolol tartrate 50 milliGRAM(s) Oral daily  metoprolol tartrate 25 milliGRAM(s) Oral at bedtime          famotidine    Tablet 20 milliGRAM(s) Oral daily  polyethylene glycol 3350 17 Gram(s) Oral daily PRN    allopurinol 300 milliGRAM(s) Oral daily  atorvastatin 40 milliGRAM(s) Oral at bedtime  dextrose 50% Injectable 12.5 Gram(s) IV Push once  dextrose 50% Injectable 25 Gram(s) IV Push once  dextrose 50% Injectable 25 Gram(s) IV Push once  dextrose Oral Gel 15 Gram(s) Oral once PRN  glucagon  Injectable 1 milliGRAM(s) IntraMuscular once  insulin lispro (ADMELOG) corrective regimen sliding scale   SubCutaneous Before meals and at bedtime    aspirin enteric coated 81 milliGRAM(s) Oral daily  chlorhexidine 4% Liquid 1 Application(s) Topical once  dextrose 5%. 1000 milliLiter(s) IV Continuous <Continuous>  dextrose 5%. 1000 milliLiter(s) IV Continuous <Continuous>  heparin   Injectable 4100 Unit(s) IV Push every 6 hours PRN  heparin  Infusion. 850 Unit(s)/Hr IV Continuous <Continuous>  tamsulosin 0.4 milliGRAM(s) Oral at bedtime    	  VITAL SIGNS:  T(C): 36.4 (10-21-24 @ 07:37), Max: 36.6 (10-20-24 @ 08:33)  HR: 65 (10-21-24 @ 07:37) (56 - 66)  BP: 156/84 (10-21-24 @ 07:37) (144/69 - 198/90)  RR: 16 (10-21-24 @ 07:37) (16 - 18)  SpO2: 94% (10-21-24 @ 07:37) (94% - 96%)  Wt(kg): --    I&O's Summary    20 Oct 2024 07:01  -  21 Oct 2024 07:00  --------------------------------------------------------  IN: 244 mL / OUT: 1175 mL / NET: -931 mL                                           PHYSICAL EXAM:  Appearance: Normal	  HEENT: Normal oral mucosa, PERRL, EOMI	  Neck: Supple, + JVD/ - JVD; ___ Carotid Bruit   Cardiovascular: Normal S1 S2, No murmurs  Respiratory: Lungs clear to auscultation/Decreased Breath Sounds/No Rales, Rhonchi, Wheezing	  Gastrointestinal:  Soft, Non-tender, + BS	  Skin: No rashes, No ecchymoses, No cyanosis  Extremities: Normal range of motion, No clubbing, cyanosis or edema  Vascular: Peripheral pulses palpable 2+ bilaterally  Neurologic: Non-focal  Psychiatry: A & O x 3, Mood & affect appropriate    LABS:	 	                                  12.0   5.27  )-----------( 115      ( 20 Oct 2024 07:52 )             36.8     10-20    143  |  104  |  27[H]  ----------------------------<  141[H]  4.2   |  34[H]  |  1.51[H]    Ca    9.2      20 Oct 2024 07:52  Mg     2.1     10-20      proBNP:   Lipid Profile:   HgA1c:   TSH:   PT/INR - ( 19 Oct 2024 16:57 )   PT: 12.1 sec;   INR: 1.05          PTT - ( 20 Oct 2024 21:17 )  PTT:64.3 sec Cardiology PA Adult Progress Note    SUBJECTIVE ASSESSMENT: Seen and examined at bedside. This morning became a little agitated and anxious tried pulling out IV, redirected and became calm. He is now asx, denies CP, palpitations or any other sx.  	  MEDICATIONS:  isosorbide   mononitrate ER Tablet (IMDUR) 60 milliGRAM(s) Oral daily  metoprolol tartrate 50 milliGRAM(s) Oral daily  metoprolol tartrate 25 milliGRAM(s) Oral at bedtime  famotidine    Tablet 20 milliGRAM(s) Oral daily  polyethylene glycol 3350 17 Gram(s) Oral daily PRN  allopurinol 300 milliGRAM(s) Oral daily  atorvastatin 40 milliGRAM(s) Oral at bedtime  dextrose 50% Injectable 12.5 Gram(s) IV Push once  dextrose 50% Injectable 25 Gram(s) IV Push once  dextrose 50% Injectable 25 Gram(s) IV Push once  dextrose Oral Gel 15 Gram(s) Oral once PRN  glucagon  Injectable 1 milliGRAM(s) IntraMuscular once  insulin lispro (ADMELOG) corrective regimen sliding scale   SubCutaneous Before meals and at bedtime  aspirin enteric coated 81 milliGRAM(s) Oral daily  chlorhexidine 4% Liquid 1 Application(s) Topical once  dextrose 5%. 1000 milliLiter(s) IV Continuous <Continuous>  dextrose 5%. 1000 milliLiter(s) IV Continuous <Continuous>  heparin   Injectable 4100 Unit(s) IV Push every 6 hours PRN  heparin  Infusion. 850 Unit(s)/Hr IV Continuous <Continuous>  tamsulosin 0.4 milliGRAM(s) Oral at bedtime    	  VITAL SIGNS:  T(C): 36.4 (10-21-24 @ 07:37), Max: 36.6 (10-20-24 @ 08:33)  HR: 65 (10-21-24 @ 07:37) (56 - 66)  BP: 156/84 (10-21-24 @ 07:37) (144/69 - 198/90)  RR: 16 (10-21-24 @ 07:37) (16 - 18)  SpO2: 94% (10-21-24 @ 07:37) (94% - 96%)  Wt(kg): --    I&O's Summary    20 Oct 2024 07:01  -  21 Oct 2024 07:00  --------------------------------------------------------  IN: 244 mL / OUT: 1175 mL / NET: -931 mL                                       PHYSICAL EXAM:  Appearance: Normal	  HEENT: Normal oral mucosa, PERRL, EOMI	  Neck: Supple, - JVD; No Carotid Bruit   Cardiovascular: Normal S1 S2, No murmurs  Respiratory: Lungs clear to auscultation  Gastrointestinal:  Soft, Non-tender, + BS	  Skin: No rashes, No ecchymoses, No cyanosis  Extremities: Normal range of motion, No clubbing, cyanosis or edema  Vascular: Peripheral pulses palpable 2+ bilaterally  Neurologic: Non-focal  Psychiatry: A & O x 1-2 to name/birthday    LABS:	                         12.0   5.27  )-----------( 115      ( 20 Oct 2024 07:52 )             36.8     10-20    143  |  104  |  27[H]  ----------------------------<  141[H]  4.2   |  34[H]  |  1.51[H]    Ca    9.2      20 Oct 2024 07:52  Mg     2.1     10-20      PT/INR - ( 19 Oct 2024 16:57 )   PT: 12.1 sec;   INR: 1.05          PTT - ( 20 Oct 2024 21:17 )  PTT:64.3 sec

## 2024-10-21 NOTE — PROGRESS NOTE ADULT - PROBLEM SELECTOR PLAN 7
-c/w tamsulosin 0.4 mg QD    F: None  N: DASH with consistent carb, 1.5 liter fluid restriction  E: Maintain K>4.0 and Mg>2.0  VTE PPx: Heparin gtt  Code: Full  Dispo: Pending clinical progression

## 2024-10-21 NOTE — PROGRESS NOTE ADULT - PROBLEM SELECTOR PLAN 2
+faint bibasilar crackles, trace pedal edema bilaterally, sating 95% on RA.  - CXR with mild pulmonary vascular congestion, BNP 2654.  - TTE 11/2023: EF 60%; mild AR; otherwise normal     o pending repeat TTE  - s/p Lasix 40mg IVP x1, negative 2.3L / 24hrs; no further diuretic needed Clinically euvolemic with warm extremities   - CXR no congestion. BNP 2654.  - TTE 11/2023: EF 60%; mild AR; otherwise normal. Pending repeat TTE as above  - s/p Lasix 40mg IVP x1, negative 2.3L / 24hrs; no further diuretic needed

## 2024-10-21 NOTE — PROGRESS NOTE ADULT - NSPROGADDITIONALINFOA_GEN_ALL_CORE
Attending Attestation:  I was physically present for the key portions of the evaluation and management (E/M) service provided. I reviewed relevant data including imaging, labs and prior procedure reports available. I agree with the above history, physical, and plan which I have reviewed with the following edits/addendum:    89M w CAD s/p PCI who p/w FLORES x days  - TTE shows preserved EF without WMA  - discussed with primary cardiologist, will manage medically given overall r/b  - DAPT, increase Imdur for better BP management    Nilton Amos MD  Cardiology

## 2024-10-21 NOTE — PROGRESS NOTE ADULT - PROBLEM SELECTOR PLAN 4
c/w HOME MEDS: Metoprolol Tartrate 50mg PO daily and 25mg PO qhs and Imdur ER 30mg PO daily. hx CKD - follows with Dr Abdullahi, unclear baseline  - Cr 1.5, stable. Avoid nephrotoxic meds

## 2024-10-21 NOTE — PROGRESS NOTE ADULT - ASSESSMENT
89 yo M PMHx HTN, HLD, DM2, BPH, CAD (s/p multiple PCIs, most recently in 2015 @[facility]) admitted for unstable angina after c/o worsening SOB x1 day. Trop T + without EKG changes. S/p Lasix 40mg IVP x1, started on heparin gtt. Plan for cardiac cath 10/21/24. 89 yo M PMHx HTN, HLD, DM2, BPH, CAD (s/p multiple PCIs) c/o worsening SOB x 1 day, admitted for ACS management. Currently medically managed w/ heparin gtt, antianginals. Pending TTE +/- cardiac cath based on clinical course.

## 2024-10-21 NOTE — CONSULT NOTE ADULT - SUBJECTIVE AND OBJECTIVE BOX
HPI: Mr. Almonte is a 89 yo M with a PMH of HTN, HLD, T2DM, BPH, CAD (s/p multiple PCIs, most recently in 2015), presented with one day of worsening shortness of breath thought to be representative of unstable angina. On admission with elevated troponin although no new ischemic EKG changes. Given IV diuresis and started on heparin drip, planned for catheterization on this hospitalization.     Seen at bedside this morning, initially sleeping but awakened easily. Not having any symptoms this morning, states breathing feels fine and has no chest pain. Hungry. Reportedly experienced some confusion/delirium overnight, sitter at bedside with patient.     MEDICATIONS  (STANDING):  allopurinol 300 milliGRAM(s) Oral daily  aspirin enteric coated 81 milliGRAM(s) Oral daily  atorvastatin 40 milliGRAM(s) Oral at bedtime  chlorhexidine 4% Liquid 1 Application(s) Topical once  dextrose 5%. 1000 milliLiter(s) (50 mL/Hr) IV Continuous <Continuous>  dextrose 5%. 1000 milliLiter(s) (100 mL/Hr) IV Continuous <Continuous>  dextrose 50% Injectable 12.5 Gram(s) IV Push once  dextrose 50% Injectable 25 Gram(s) IV Push once  dextrose 50% Injectable 25 Gram(s) IV Push once  famotidine    Tablet 20 milliGRAM(s) Oral daily  glucagon  Injectable 1 milliGRAM(s) IntraMuscular once  heparin  Infusion.  Unit(s)/Hr (8 mL/Hr) IV Continuous <Continuous>  insulin lispro (ADMELOG) corrective regimen sliding scale   SubCutaneous Before meals and at bedtime  isosorbide   mononitrate ER Tablet (IMDUR) 60 milliGRAM(s) Oral daily  metoprolol tartrate 50 milliGRAM(s) Oral daily  metoprolol tartrate 25 milliGRAM(s) Oral at bedtime  tamsulosin 0.4 milliGRAM(s) Oral at bedtime    MEDICATIONS  (PRN):  dextrose Oral Gel 15 Gram(s) Oral once PRN Blood Glucose LESS THAN 70 milliGRAM(s)/deciliter  heparin   Injectable 4100 Unit(s) IV Push every 6 hours PRN For aPTT less than 40  polyethylene glycol 3350 17 Gram(s) Oral daily PRN Constipation    CAPILLARY BLOOD GLUCOSE      POCT Blood Glucose.: 140 mg/dL (21 Oct 2024 07:41)  POCT Blood Glucose.: 164 mg/dL (20 Oct 2024 21:33)  POCT Blood Glucose.: 145 mg/dL (20 Oct 2024 16:46)  POCT Blood Glucose.: 215 mg/dL (20 Oct 2024 11:33)    I&O's Summary    20 Oct 2024 07:01  -  21 Oct 2024 07:00  --------------------------------------------------------  IN: 244 mL / OUT: 1475 mL / NET: -1231 mL    21 Oct 2024 07:01  -  21 Oct 2024 10:39  --------------------------------------------------------  IN: 0 mL / OUT: 500 mL / NET: -500 mL        PHYSICAL EXAM:  Vital Signs Last 24 Hrs  T(C): 36.4 (21 Oct 2024 07:37), Max: 36.6 (20 Oct 2024 15:24)  T(F): 97.5 (21 Oct 2024 07:37), Max: 97.9 (20 Oct 2024 15:24)  HR: 65 (21 Oct 2024 07:37) (56 - 66)  BP: 156/84 (21 Oct 2024 07:37) (144/69 - 198/90)  BP(mean): --  RR: 16 (21 Oct 2024 07:37) (16 - 18)  SpO2: 94% (21 Oct 2024 07:37) (94% - 96%)    Parameters below as of 21 Oct 2024 07:37  Patient On (Oxygen Delivery Method): room air    EXAM:   Appears comfortable, elderly man sitting up in bed   MMM  Normal WOB on RA, CTAB   RRR, no mrg   Abdomen soft, nontender, nondistended  Extremities warm and without edema   AO to person, moving all extremities     LABS:                        12.6   5.86  )-----------( 128      ( 21 Oct 2024 05:30 )             39.4     10-21    139  |  102  |  33[H]  ----------------------------<  140[H]  4.1   |  27  |  1.51[H]    Ca    9.5      21 Oct 2024 05:30  Mg     2.1     10-21    TPro  6.6  /  Alb  3.8  /  TBili  0.8  /  DBili  x   /  AST  35  /  ALT  44  /  AlkPhos  81  10-21    PT/INR - ( 19 Oct 2024 16:57 )   PT: 12.1 sec;   INR: 1.05          PTT - ( 21 Oct 2024 05:30 )  PTT:27.0 sec  CARDIAC MARKERS ( 20 Oct 2024 15:02 )  x     / x     / x     / x     / 2.7 ng/mL  CARDIAC MARKERS ( 20 Oct 2024 07:52 )  x     / x     / x     / x     / 3.3 ng/mL  CARDIAC MARKERS ( 19 Oct 2024 16:57 )  x     / x     / x     / x     / 4.9 ng/mL  CARDIAC MARKERS ( 19 Oct 2024 14:55 )  x     / x     / x     / x     / 5.4 ng/mL      Urinalysis Basic - ( 21 Oct 2024 05:30 )    Color: x / Appearance: x / SG: x / pH: x  Gluc: 140 mg/dL / Ketone: x  / Bili: x / Urobili: x   Blood: x / Protein: x / Nitrite: x   Leuk Esterase: x / RBC: x / WBC x   Sq Epi: x / Non Sq Epi: x / Bacteria: x        Urinalysis with Rflx Culture (collected 19 Oct 2024 15:29)          RADIOLOGY & ADDITIONAL TESTS:  Imaging from Last 24 Hours:  No new imaging

## 2024-10-22 ENCOUNTER — TRANSCRIPTION ENCOUNTER (OUTPATIENT)
Age: 89
End: 2024-10-22

## 2024-10-22 VITALS
DIASTOLIC BLOOD PRESSURE: 67 MMHG | HEART RATE: 56 BPM | OXYGEN SATURATION: 96 % | RESPIRATION RATE: 18 BRPM | SYSTOLIC BLOOD PRESSURE: 159 MMHG

## 2024-10-22 LAB
ALBUMIN SERPL ELPH-MCNC: 3.6 G/DL — SIGNIFICANT CHANGE UP (ref 3.3–5)
ALP SERPL-CCNC: 74 U/L — SIGNIFICANT CHANGE UP (ref 40–120)
ALT FLD-CCNC: 34 U/L — SIGNIFICANT CHANGE UP (ref 10–45)
ANION GAP SERPL CALC-SCNC: 8 MMOL/L — SIGNIFICANT CHANGE UP (ref 5–17)
AST SERPL-CCNC: 30 U/L — SIGNIFICANT CHANGE UP (ref 10–40)
BILIRUB SERPL-MCNC: 0.4 MG/DL — SIGNIFICANT CHANGE UP (ref 0.2–1.2)
BUN SERPL-MCNC: 30 MG/DL — HIGH (ref 7–23)
CALCIUM SERPL-MCNC: 9.4 MG/DL — SIGNIFICANT CHANGE UP (ref 8.4–10.5)
CHLORIDE SERPL-SCNC: 109 MMOL/L — HIGH (ref 96–108)
CO2 SERPL-SCNC: 26 MMOL/L — SIGNIFICANT CHANGE UP (ref 22–31)
CREAT SERPL-MCNC: 1.51 MG/DL — HIGH (ref 0.5–1.3)
EGFR: 44 ML/MIN/1.73M2 — LOW
GLUCOSE BLDC GLUCOMTR-MCNC: 138 MG/DL — HIGH (ref 70–99)
GLUCOSE BLDC GLUCOMTR-MCNC: 150 MG/DL — HIGH (ref 70–99)
GLUCOSE SERPL-MCNC: 141 MG/DL — HIGH (ref 70–99)
HCT VFR BLD CALC: 36.2 % — LOW (ref 39–50)
HGB BLD-MCNC: 11.5 G/DL — LOW (ref 13–17)
MAGNESIUM SERPL-MCNC: 2.2 MG/DL — SIGNIFICANT CHANGE UP (ref 1.6–2.6)
MCHC RBC-ENTMCNC: 29.8 PG — SIGNIFICANT CHANGE UP (ref 27–34)
MCHC RBC-ENTMCNC: 31.8 GM/DL — LOW (ref 32–36)
MCV RBC AUTO: 93.8 FL — SIGNIFICANT CHANGE UP (ref 80–100)
NRBC # BLD: 0 /100 WBCS — SIGNIFICANT CHANGE UP (ref 0–0)
PLATELET # BLD AUTO: 124 K/UL — LOW (ref 150–400)
POTASSIUM SERPL-MCNC: 4 MMOL/L — SIGNIFICANT CHANGE UP (ref 3.5–5.3)
POTASSIUM SERPL-SCNC: 4 MMOL/L — SIGNIFICANT CHANGE UP (ref 3.5–5.3)
PROT SERPL-MCNC: 6 G/DL — SIGNIFICANT CHANGE UP (ref 6–8.3)
RBC # BLD: 3.86 M/UL — LOW (ref 4.2–5.8)
RBC # FLD: 13.4 % — SIGNIFICANT CHANGE UP (ref 10.3–14.5)
SODIUM SERPL-SCNC: 143 MMOL/L — SIGNIFICANT CHANGE UP (ref 135–145)
WBC # BLD: 4.9 K/UL — SIGNIFICANT CHANGE UP (ref 3.8–10.5)
WBC # FLD AUTO: 4.9 K/UL — SIGNIFICANT CHANGE UP (ref 3.8–10.5)

## 2024-10-22 PROCEDURE — 99233 SBSQ HOSP IP/OBS HIGH 50: CPT

## 2024-10-22 RX ORDER — CLOPIDOGREL 75 MG/1
1 TABLET ORAL
Qty: 30 | Refills: 2
Start: 2024-10-22 | End: 2025-01-19

## 2024-10-22 RX ORDER — ISOSORBIDE MONONITRATE 60 MG/1
1 TABLET, EXTENDED RELEASE ORAL
Qty: 30 | Refills: 2
Start: 2024-10-22 | End: 2025-01-19

## 2024-10-22 RX ADMIN — Medication 300 MILLIGRAM(S): at 08:57

## 2024-10-22 RX ADMIN — Medication 81 MILLIGRAM(S): at 08:58

## 2024-10-22 RX ADMIN — ISOSORBIDE MONONITRATE 120 MILLIGRAM(S): 60 TABLET, EXTENDED RELEASE ORAL at 08:58

## 2024-10-22 RX ADMIN — FAMOTIDINE 20 MILLIGRAM(S): 10 INJECTION INTRAVENOUS at 08:58

## 2024-10-22 RX ADMIN — Medication 50 MILLIGRAM(S): at 05:33

## 2024-10-22 NOTE — PROVIDER CONTACT NOTE (OTHER) - ACTION/TREATMENT ORDERED:
ANM made aware. Code Grey initiated. STAT IM Zyprexa 5 mg ordered and administered. Wife (Lizet [phone]) updated on patient's current condition as previously requested.

## 2024-10-22 NOTE — PROGRESS NOTE ADULT - SUBJECTIVE AND OBJECTIVE BOX
Cardiology PA Adult Progress Note    SUBJECTIVE ASSESSMENT:  	  MEDICATIONS:  isosorbide   mononitrate ER Tablet (IMDUR) 120 milliGRAM(s) Oral daily  metoprolol tartrate 25 milliGRAM(s) Oral at bedtime  metoprolol tartrate 50 milliGRAM(s) Oral daily          famotidine    Tablet 20 milliGRAM(s) Oral daily  polyethylene glycol 3350 17 Gram(s) Oral daily PRN    allopurinol 300 milliGRAM(s) Oral daily  atorvastatin 40 milliGRAM(s) Oral at bedtime  dextrose 50% Injectable 25 Gram(s) IV Push once  dextrose 50% Injectable 25 Gram(s) IV Push once  dextrose 50% Injectable 12.5 Gram(s) IV Push once  dextrose Oral Gel 15 Gram(s) Oral once PRN  glucagon  Injectable 1 milliGRAM(s) IntraMuscular once  insulin lispro (ADMELOG) corrective regimen sliding scale   SubCutaneous Before meals and at bedtime    aspirin enteric coated 81 milliGRAM(s) Oral daily  chlorhexidine 4% Liquid 1 Application(s) Topical once  dextrose 5%. 1000 milliLiter(s) IV Continuous <Continuous>  dextrose 5%. 1000 milliLiter(s) IV Continuous <Continuous>  tamsulosin 0.4 milliGRAM(s) Oral at bedtime    	  VITAL SIGNS:  T(C): 36.4 (10-22-24 @ 05:23), Max: 36.4 (10-21-24 @ 10:59)  HR: 63 (10-22-24 @ 01:13) (56 - 63)  BP: 154/72 (10-22-24 @ 05:23) (117/64 - 173/66)  RR: 17 (10-22-24 @ 05:23) (16 - 17)  SpO2: 95% (10-22-24 @ 05:23) (95% - 97%)  Wt(kg): --    I&O's Summary    21 Oct 2024 07:01  -  22 Oct 2024 07:00  --------------------------------------------------------  IN: 768 mL / OUT: 1100 mL / NET: -332 mL                                           PHYSICAL EXAM:  Appearance: Normal	  HEENT: Normal oral mucosa, PERRL, EOMI	  Neck: Supple, + JVD/ - JVD; ___ Carotid Bruit   Cardiovascular: Normal S1 S2, No murmurs  Respiratory: Lungs clear to auscultation/Decreased Breath Sounds/No Rales, Rhonchi, Wheezing	  Gastrointestinal:  Soft, Non-tender, + BS	  Skin: No rashes, No ecchymoses, No cyanosis  Extremities: Normal range of motion, No clubbing, cyanosis or edema  Vascular: Peripheral pulses palpable 2+ bilaterally  Neurologic: Non-focal  Psychiatry: A & O x 3, Mood & affect appropriate    LABS:	 	                                  11.5   4.90  )-----------( 124      ( 22 Oct 2024 05:30 )             36.2     10-21    139  |  102  |  33[H]  ----------------------------<  140[H]  4.1   |  27  |  1.51[H]    Ca    9.5      21 Oct 2024 05:30  Mg     2.1     10-21    TPro  6.6  /  Alb  3.8  /  TBili  0.8  /  DBili  x   /  AST  35  /  ALT  44  /  AlkPhos  81  10-21    proBNP:   Lipid Profile:   HgA1c:   TSH:   PTT - ( 21 Oct 2024 17:07 )  PTT:54.2 sec

## 2024-10-22 NOTE — DISCHARGE NOTE PROVIDER - NSDCMRMEDTOKEN_GEN_ALL_CORE_FT
allopurinol 300 mg oral tablet: 1 tab(s) orally once a day  Aspirin Enteric Coated 81 mg oral delayed release tablet: 1 tab(s) orally once a day  Flomax 0.4 mg oral capsule: 1 cap(s) orally once a day  isosorbide mononitrate 30 mg oral tablet, extended release: 1 tab(s) orally once a day (in the morning)  Lipitor 40 mg oral tablet: 1 tab(s) orally once a day (at bedtime)  Metoprolol Tartrate 25 mg oral tablet: 1 tab(s) orally once a day (at bedtime)  metoprolol tartrate 50 mg oral tablet: 1 tab(s) orally once a day  Pepcid 20 mg oral tablet: 1 tab(s) orally once a day   allopurinol 300 mg oral tablet: 1 tab(s) orally once a day  Aspirin Enteric Coated 81 mg oral delayed release tablet: 1 tab(s) orally once a day  Flomax 0.4 mg oral capsule: 1 cap(s) orally once a day  Lipitor 40 mg oral tablet: 1 tab(s) orally once a day (at bedtime)  Metoprolol Tartrate 25 mg oral tablet: 1 tab(s) orally once a day (at bedtime)  metoprolol tartrate 50 mg oral tablet: 1 tab(s) orally once a day  Pepcid 20 mg oral tablet: 1 tab(s) orally once a day   allopurinol 300 mg oral tablet: 1 tab(s) orally once a day  Aspirin Enteric Coated 81 mg oral delayed release tablet: 1 tab(s) orally once a day  Flomax 0.4 mg oral capsule: 1 cap(s) orally once a day  isosorbide mononitrate 120 mg oral tablet, extended release: 1 tab(s) orally once a day  Lipitor 40 mg oral tablet: 1 tab(s) orally once a day (at bedtime)  Metoprolol Tartrate 25 mg oral tablet: 1 tab(s) orally once a day (at bedtime)  metoprolol tartrate 50 mg oral tablet: 1 tab(s) orally once a day  Pepcid 20 mg oral tablet: 1 tab(s) orally once a day  Plavix 75 mg oral tablet: 1 tab(s) orally once a day  Rehab: Please attend cardiac rehab 3 times a week for a total of 12 weeks

## 2024-10-22 NOTE — PROGRESS NOTE ADULT - SUBJECTIVE AND OBJECTIVE BOX
Medicine Progress Note    Patient is a 89y old  Male who presents with a chief complaint of acute decompensated CHF in setting of ACS (22 Oct 2024 07:58)      SUBJECTIVE / OVERNIGHT EVENTS:  No new symptoms, had some anxiety/sundowning overnight last night and pulled out IV. Calm this morning.     MEDICATIONS  (STANDING):  allopurinol 300 milliGRAM(s) Oral daily  aspirin enteric coated 81 milliGRAM(s) Oral daily  atorvastatin 40 milliGRAM(s) Oral at bedtime  chlorhexidine 4% Liquid 1 Application(s) Topical once  dextrose 5%. 1000 milliLiter(s) (50 mL/Hr) IV Continuous <Continuous>  dextrose 5%. 1000 milliLiter(s) (100 mL/Hr) IV Continuous <Continuous>  dextrose 50% Injectable 12.5 Gram(s) IV Push once  dextrose 50% Injectable 25 Gram(s) IV Push once  dextrose 50% Injectable 25 Gram(s) IV Push once  famotidine    Tablet 20 milliGRAM(s) Oral daily  glucagon  Injectable 1 milliGRAM(s) IntraMuscular once  insulin lispro (ADMELOG) corrective regimen sliding scale   SubCutaneous Before meals and at bedtime  isosorbide   mononitrate ER Tablet (IMDUR) 120 milliGRAM(s) Oral daily  metoprolol tartrate 25 milliGRAM(s) Oral at bedtime  metoprolol tartrate 50 milliGRAM(s) Oral daily  tamsulosin 0.4 milliGRAM(s) Oral at bedtime    MEDICATIONS  (PRN):  dextrose Oral Gel 15 Gram(s) Oral once PRN Blood Glucose LESS THAN 70 milliGRAM(s)/deciliter  polyethylene glycol 3350 17 Gram(s) Oral daily PRN Constipation    CAPILLARY BLOOD GLUCOSE      POCT Blood Glucose.: 138 mg/dL (22 Oct 2024 07:42)  POCT Blood Glucose.: 172 mg/dL (21 Oct 2024 17:06)  POCT Blood Glucose.: 155 mg/dL (21 Oct 2024 11:43)    I&O's Summary    21 Oct 2024 07:01  -  22 Oct 2024 07:00  --------------------------------------------------------  IN: 768 mL / OUT: 1100 mL / NET: -332 mL        PHYSICAL EXAM:  Vital Signs Last 24 Hrs  T(C): 36.6 (22 Oct 2024 08:54), Max: 36.6 (22 Oct 2024 08:54)  T(F): 97.8 (22 Oct 2024 08:54), Max: 97.8 (22 Oct 2024 08:54)  HR: 51 (22 Oct 2024 08:54) (51 - 63)  BP: 139/63 (22 Oct 2024 08:54) (117/64 - 173/66)  BP(mean): 90 (22 Oct 2024 08:54) (90 - 106)  RR: 18 (22 Oct 2024 08:54) (16 - 18)  SpO2: 94% (22 Oct 2024 08:54) (94% - 97%)    Parameters below as of 22 Oct 2024 08:54  Patient On (Oxygen Delivery Method): room air    EXAM:   Appears comfortable, sleeping in bed   MMM  Normal WOB on RA, CTAB   RRR, no mrg   Abdomen soft, nontender, nondistended  Extremities warm and without edema   Does not consistently answer orientation questions but no gross focal neuro deficits      LABS:                        11.5   4.90  )-----------( 124      ( 22 Oct 2024 05:30 )             36.2     10-22    143  |  109[H]  |  30[H]  ----------------------------<  141[H]  4.0   |  26  |  1.51[H]    Ca    9.4      22 Oct 2024 05:30  Mg     2.2     10-22    TPro  6.0  /  Alb  3.6  /  TBili  0.4  /  DBili  x   /  AST  30  /  ALT  34  /  AlkPhos  74  10-22    PTT - ( 21 Oct 2024 17:07 )  PTT:54.2 sec  CARDIAC MARKERS ( 20 Oct 2024 15:02 )  x     / x     / x     / x     / 2.7 ng/mL      Urinalysis Basic - ( 22 Oct 2024 05:30 )    Color: x / Appearance: x / SG: x / pH: x  Gluc: 141 mg/dL / Ketone: x  / Bili: x / Urobili: x   Blood: x / Protein: x / Nitrite: x   Leuk Esterase: x / RBC: x / WBC x   Sq Epi: x / Non Sq Epi: x / Bacteria: x        Urinalysis with Rflx Culture (collected 19 Oct 2024 15:29)          RADIOLOGY & ADDITIONAL TESTS:  Imaging from Last 24 Hours:    TTE 10/21/2024:   CONCLUSIONS:     1. Normal left and right ventricular size and systolic function.   2. Mild symmetric left ventricular hypertrophy.   3. Indeterminate left ventricular diastolic function.   4. No significant valvular disease.   5. No pericardial effusion.

## 2024-10-22 NOTE — PHYSICAL THERAPY INITIAL EVALUATION ADULT - GENERAL OBSERVATIONS, REHAB EVAL
Pt received semi supine in bed +hep lock +tele. RN Lian aware of intent to treat. pt tolerated sessoin fairly well, amb 100 ft with CGA no AD and negotiated 5 steps with HR CGA step over step. pt was left as revied with call bell in reach, VSS, and in NAD.

## 2024-10-22 NOTE — PROGRESS NOTE ADULT - ASSESSMENT
89 yo M with a PMH of HTN, HLD, T2DM, BPH, CAD (s/p multiple PCIs, most recently in 2015), presented with one day of worsening shortness of breath thought to be representative of unstable angina. On admission with elevated troponin although no new ischemic EKG changes. Given IV diuresis and started on heparin drip, initially planned for potential catheterization on this hospitalization but now planned for medical management. Medicine consulted for comanagement.     #Unstable angina   #Pulmonary edema   -Troponin on admission elevated   -sp CXR on admission with mild pulmonary vascular congestion, given IV lasix 40 mg x1  -Continue medical mgmt as below:   -sp  mg, continue daily ASA 81   -sp heparin drip for 48hrs, now completed   -repeat TTE performed on 10/21 showing normal LV EF     #CAD s/p multiple PCI, most recent in 2015  -Continue daily ASA as above   -Continue atorvastatin 40 mg daily     #LAUREN on CKD   -Baseline appears to be 1.2-1.3 range, currently still in 1.5 range.   -Avoid nephrotoxins as able, renally dose all medications   -Would plan for short term interval follow up with PCP to recheck Cr as outpatient, if still high, may need to pursue renal US, urine studies     #Severe HTN   -Continue home medications:   Metoprolol tartrate 50 mg in AM and 25 mg in PM   INCREASED imdur to 120 mg daily     #Thrombocytopenia   -Continue to monitor for now, stable   -Consider further workup with smear, 4T score if worsens     #T2DM   -ISS     #BPH   -Continue home tamsulosin 0.4 mg daily     DVT ppx: none currently, planned for discharge today

## 2024-10-22 NOTE — PHYSICAL THERAPY INITIAL EVALUATION ADULT - PERTINENT HX OF CURRENT PROBLEM, REHAB EVAL
89 yo M with a PMH of HTN, HLD, T2DM, BPH, CAD (s/p multiple PCIs, most recently in 2015), presented with one day of worsening shortness of breath thought to be representative of unstable angina. On admission with elevated troponin although no new ischemic EKG changes. Given IV diuresis and started on heparin drip, initially planned for potential catheterization on this hospitalization but now planned for medical management. Medicine consulted for comanagement.

## 2024-10-22 NOTE — DISCHARGE NOTE PROVIDER - NSDCCAREPROVSEEN_GEN_ALL_CORE_FT
function is intact.      Coordination: Coordination is intact.      Gait: Gait is intact.   Psychiatric:         Mood and Affect: Mood normal.         Body mass index is 30.99 kg/m².       Plan:    1. School physical exam  Assessment & Plan:  Flu vax today  Will obtain vax list from [facility] to verify needed titers  She completed 2 COVID-19 vaccines in 2021  Tdap is due 7/2024  [facility]-these results are not available to this office.  She was informed she needed to complete 2 TB test within 14 days of 1 another.  She was also informed she needed to complete needs TB test either on a Monday or Tuesday as it requires a 72-hour recheck.  Patient to schedule to nurse appointments with in the above timeframe  These are all requirements of her school  Once all is obtained and completed, will review and order appropriate labs.  See scanned media  2. Encounter for completion of form with patient      Reviewed medication and completed medication reconciliation with the patient. Reviewed side effects of medications with the patient. Questions were answered and patient verb understanding.    Past Medical History:   Diagnosis Date    Anxiety     Dyslipidemia 7/15/2021    Obese body habitus 7/15/2021    Unprotected sex 7/15/2021     Current Outpatient Medications   Medication Sig    meloxicam (MOBIC) 7.5 MG tablet Take 1 tablet by mouth daily as needed for Pain     No current facility-administered medications for this visit.       Past Surgical History:   Procedure Laterality Date    ORTHOPEDIC SURGERY  2013    right knee surgery - ACL     Allergies and Intolerances:   No Known Allergies  Family History:   Family History   Problem Relation Age of Onset    No Known Problems Father     Anxiety Disorder Brother     No Known Problems Mother      Social History:   She  reports that she has never smoked. She has never used smokeless tobacco.   Social History     Substance and Sexual  Nilton Amos

## 2024-10-22 NOTE — DISCHARGE NOTE NURSING/CASE MANAGEMENT/SOCIAL WORK - FINANCIAL ASSISTANCE
tamsulosin  0.4 mg Oral Nightly     PRN Meds:  glucose, dextrose, glucagon (rDNA), dextrose, ondansetron, glucose, glucagon (rDNA), dextrose, HYDROcodone-acetaminophen, LORazepam, tiZANidine, dextrose    Results:  CBC:   Recent Labs     05/10/20  1841 05/11/20  0426   WBC 10.2 6.2   HGB 14.5 12.9*   HCT 45.8 39.4*   MCV 99.0 94.0    215     BMP:   Recent Labs     05/12/20  0030 05/12/20  0519 05/12/20  1400 05/13/20  0521   * 133* 132* 139   K 3.3* 3.3* 3.7 3.2*   CL 99 100 94* 95*   CO2 20* 20* 22 30   PHOS 2.7 2.7  --  3.7   BUN 11 9 6* 11   CREATININE 0.7* 0.7* 0.7* 0.6*     LIVER PROFILE:   Recent Labs     05/10/20  1841 05/10/20  2354   AST 7*  --    ALT 12  --    LIPASE 55.0 55.0   BILITOT 0.5  --    ALKPHOS 106  --        Cultures:  None    Films:  CXR 5/10 was reviewed by me and it showed   No acute cardiopulmonary disease    ASSESSMENT:  · Diabetic ketoacidosis  · Electrolytes disorder   · Anion wang metabolic acidosis  · Acute renal failure   · Uncontrolled HTN     PLAN:   Supplemental oxygen to maintain SaO2 >92%; wean as tolerated  · Lantus and ISS   · Monitor off Abx   · Electrolytes replacement   · Diabetic education.    · Resumed Lisinopril   · Resumed Lyrica, flomax, nd statin   · Will follow on the periphery and call with questions [facility] provides services at a reduced cost to those who are determined to be eligible through [facility]’s financial assistance program. Information regarding [facility]’s financial assistance program can be found by going to [email or web address] or by calling [phone].

## 2024-10-22 NOTE — PROVIDER CONTACT NOTE (OTHER) - ASSESSMENT
Upon walking into patient's room, patient seen screaming and threatening staff. A&Ox1, disoriented to time, place, and situation. Patient stepped outside of room requesting for everyone to "get out of his apartment" and became aggressive to other staff on the floor. Refusing vital signs. The patient remained unorientable despite several attempts at reorientation.

## 2024-10-22 NOTE — PHYSICAL THERAPY INITIAL EVALUATION ADULT - ADDITIONAL COMMENTS
pt states he lives with his wife in elevator building with 5 SYLVIA. pt was independent with ADLs and amb PTA. goes to [facility]

## 2024-10-22 NOTE — PROVIDER CONTACT NOTE (OTHER) - BACKGROUND
Patient with previous intermittent episode of delirium on 10/20. During episode, patient received Zyprexa 5 mg total with improvement in symptoms.

## 2024-10-22 NOTE — PROGRESS NOTE ADULT - REASON FOR ADMISSION
acute decompensated CHF in setting of ACS

## 2024-10-22 NOTE — DISCHARGE NOTE PROVIDER - CARE PROVIDER_API CALL
Marlon Nice  Cardiovascular Disease  [address]  [place] [address]  Phone: [phone]  Fax: [phone]  Established Patient  Follow Up Time: 1 week

## 2024-10-22 NOTE — DISCHARGE NOTE PROVIDER - HOSPITAL COURSE
89 yo M PMHx HTN, HLD, DM2, BPH, CAD (s/p multiple PCIs) c/o worsening SOB x 1 day. Troponin T elevated 53->48->61; normal CK/CKMB in the setting of CKD. BNP was elevated at 2654. Pt was given IV lasix 40 mg x 1 in ED. EKG nonischemic. Pt was admitted to rule out ACS.    Pt was initiated on heparin gtt and given aspirin load for ACS management. Pt was uptitrated on imdur to 120 mg daily, with improvement of blood pressures. Underwent TTE on 10/21/24 revealing normal LVSF and RVSF, mild LVH, no significant VHD. Given patient is completely asymptomatic, no EKG changes, and normal EF w/ no WMA on TTE, plan for medical management only w/ no invasive intervention. Plan discussed with primary cardiologist Dr. Nice as well as wife Lizet who is in agreement with plan.    During hospital stay, pt had intermittent episodes of delirium attributed to hospital associated delirium requiring antipsychotic agents that resolved. CT brain performed w/       Problem/Plan - 3:  ·  Problem: Delirium.   ·  Plan: Intermittent episodes of delirium with agitation since hospital stay.   -S/p seroquel 25 mg po x 1   -CT brain pending  -Supportive care provided including encouraging mobility, reorienting patient, involving family & regulating sleep wake cycles.     Problem/Plan - 4:  ·  Problem: LAUREN (acute kidney injury).   ·  Plan: hx CKD - follows with Dr Abdullahi, unclear baseline  - Cr 1.5, stable. Avoid nephrotoxic meds.     Problem/Plan - 5:  ·  Problem: Hypertension.   ·  Plan: -180s  -C/w metoprolol tartrate 50mg PO daily and 25mg PO qhs   -Inc Imdur to 90 mg PO daily.     Problem/Plan - 6:  ·  Problem: DM2 (diabetes mellitus, type 2).   ·  Plan: A1C 6.8%  - NICOLE.     Problem/Plan - 7:  ·  Problem: BPH (benign prostatic hyperplasia).   ·  Plan: -c/w tamsulosin 0.4 mg QD    F: None  N: DASH with consistent carb, 1.5 liter fluid restriction  E: Maintain K>4.0 and Mg>2.0  VTE PPx: Heparin gtt  Code: Full  Dispo: Pending clinical progression.     89 yo M PMHx HTN, HLD, DM2, BPH, CAD (s/p multiple PCIs) c/o worsening SOB x 1 day. Troponin T elevated 53->48->61; normal CK/CKMB in the setting of CKD. BNP was elevated at 2654. Pt was given IV lasix 40 mg x 1 in ED. EKG nonischemic. Pt was admitted to rule out ACS.    Pt was initiated on heparin gtt and given aspirin load for ACS management. Pt was uptitrated on imdur to 120 mg daily, with improvement of blood pressures. Underwent TTE on 10/21/24 revealing normal LVSF and RVSF, mild LVH, no significant VHD. Given patient is completely asymptomatic, no EKG changes, and normal EF w/ no WMA on TTE, plan for medical management only w/ no invasive intervention. Plan discussed with primary cardiologist Dr. Nice as well as wife Lizet who is in agreement with plan.    During hospital stay, pt had intermittent episodes of hospital associated delirium requiring antipsychotic agents that resolved. CT brain performed w/ no acute abnormality. Patient was alert and oriented with no focal neuro deficits on discharge.    Pt is asymptomatic at this time and denies chest pain, SOB, FLORES, palpitations, dizziness, LOC, N/V, diaphoresis, orthopnea/PND, and leg swelling. Pt able to ambulate and void without complication. VSS. Labs and telemetry reviewed.     Pt is a candidate for discharge per Dr. Amos. Pt given appropriate discharge instructions, pt states they have an appropriate amount of their previous home meds unchanged from this visit at home, and any new medications were sent to their pharmacy. Pt instructed to f/u with Dr. Nice in 1-2 weeks.    GLP-1 receptor agonist/SGLT2 inhibitor meds discussed w/ patients and encouraged to discuss further with PMD or Endocrinologist at next visit.      Pt discharge copies detail cardiovascular history, medications, testing/treatments, OR patient has created a patient portal account and instructed to provide their records at their 1st appointment.    Discharge meds:  o Metoprolol Tartrate 25 mg QD in AM and Metoprolol Tartrate 50 mg QD in PM  o Allopurinol 300 mg QD  o Pepcid 20 mg QD  o Aspirin 81 mg QD  o Imdur 120 mg QD  o Lipitor 40 mg QD  o Flomax 0.4 mg QD   89 yo M PMHx HTN, HLD, DM2, BPH, CAD (s/p multiple PCIs) c/o worsening SOB x 1 day. Troponin T elevated 53->48->61; normal CK/CKMB in the setting of CKD. BNP was elevated at 2654. Pt was given IV lasix 40 mg x 1 in ED. EKG nonischemic. Pt was admitted to rule out ACS.    Pt was initiated on heparin gtt and given aspirin load for ACS management. Pt was uptitrated on imdur to 120 mg daily, with improvement of blood pressures. Underwent TTE on 10/21/24 revealing normal LVSF and RVSF, mild LVH, no significant VHD. Given patient is completely asymptomatic, no EKG changes, and normal EF w/ no WMA on TTE, plan for medical management only w/ no invasive intervention. Plan discussed with primary cardiologist Dr. Nice as well as wife Lizet who is in agreement with plan.    During hospital stay, pt had intermittent episodes of hospital associated delirium requiring antipsychotic agents that resolved. CT brain performed w/ no acute abnormality. Patient was alert and oriented with no focal neuro deficits on discharge.    Pt is asymptomatic at this time and denies chest pain, SOB, FLORES, palpitations, dizziness, LOC, N/V, diaphoresis, orthopnea/PND, and leg swelling. Pt able to ambulate and void without complication. VSS. Labs and telemetry reviewed.     Pt is a candidate for discharge per Dr. Amos. Pt given appropriate discharge instructions, pt states they have an appropriate amount of their previous home meds unchanged from this visit at home, and any new medications were sent to their pharmacy. Pt instructed to f/u with Dr. Nice in 1-2 weeks.    GLP-1 receptor agonist/SGLT2 inhibitor meds discussed w/ patients and encouraged to discuss further with PMD or Endocrinologist at next visit.      Pt discharge copies detail cardiovascular history, medications, testing/treatments, OR patient has created a patient portal account and instructed to provide their records at their 1st appointment.    Discharge meds:  o Metoprolol Tartrate 25 mg QD in AM and Metoprolol Tartrate 50 mg QD in PM  o Allopurinol 300 mg QD  o Pepcid 20 mg QD  o Aspirin 81 mg QD  o Plavix 75 mg QD  o Imdur 120 mg QD  o Lipitor 40 mg QD  o Flomax 0.4 mg QD

## 2024-10-22 NOTE — DISCHARGE NOTE NURSING/CASE MANAGEMENT/SOCIAL WORK - PATIENT PORTAL LINK FT
You can access the FollowMyHealth Patient Portal offered by [facility] by registering at the following website: [email or web address]. By joining [facility]’s FollowMyHealth portal, you will also be able to view your health information using other applications (apps) compatible with our system.

## 2024-10-22 NOTE — DISCHARGE NOTE PROVIDER - NSDCCPCAREPLAN_GEN_ALL_CORE_FT
PRINCIPAL DISCHARGE DIAGNOSIS  Diagnosis: SOB (shortness of breath)  Assessment and Plan of Treatment: You presented to the hospital for shortness of breath. Your troponin, cardiac protein excreted by the heart was elevated on arrival however it was in the setting of kidney disease. Your EKG revealed no ischemic changes. Your echocardiogram showed a normal ejection fraction with no wall motion abnormality hence we are optimizing you with medications only instead of any invasive approach.  o Please take aspirin 81 mg daily and plavix 75 mg daily  o Take the increased dose of imdur 120 mg daily  o Take metoprolol tartrate 50 mg in morning and 25 mg at night  o Please take atorvastatin 40 mg daily  Follow up with Dr. Nice within 1-2 weeks.      SECONDARY DISCHARGE DIAGNOSES  Diagnosis: Hypertension  Assessment and Plan of Treatment: Please take medications above to help your blood pressure.    Diagnosis: DM2 (diabetes mellitus, type 2)  Assessment and Plan of Treatment: Your Hemoglobin A1c is 6.8% and your goal A1c is less than 7.0%. This number measures your average blood sugar level over the last three months.  Please continue medications as listed for diabetes. Maintain a low carbohydrate, low sugar diet, exercise, monitor your fingerstick blood sugars regarly and follow up with your Endocrinologist/Primary Care Doctor.      Diagnosis: BPH (benign prostatic hyperplasia)  Assessment and Plan of Treatment: Continue your home medications for benign prostatic hyperplasia.

## 2024-10-23 ENCOUNTER — TRANSCRIPTION ENCOUNTER (OUTPATIENT)
Age: 89
End: 2024-10-23

## 2024-10-25 ENCOUNTER — APPOINTMENT (OUTPATIENT)
Dept: CARE COORDINATION | Facility: HOME HEALTH | Age: 89
End: 2024-10-25
Payer: MEDICARE

## 2024-10-25 VITALS
OXYGEN SATURATION: 95 % | RESPIRATION RATE: 18 BRPM | SYSTOLIC BLOOD PRESSURE: 111 MMHG | HEART RATE: 60 BPM | TEMPERATURE: 98.7 F | DIASTOLIC BLOOD PRESSURE: 58 MMHG

## 2024-10-25 DIAGNOSIS — Z86.79 PERSONAL HISTORY OF OTHER DISEASES OF THE CIRCULATORY SYSTEM: ICD-10-CM

## 2024-10-25 DIAGNOSIS — I10 ESSENTIAL (PRIMARY) HYPERTENSION: ICD-10-CM

## 2024-10-25 DIAGNOSIS — N13.8 BENIGN PROSTATIC HYPERPLASIA WITH LOWER URINARY TRACT SYMPMS: ICD-10-CM

## 2024-10-25 DIAGNOSIS — N40.1 BENIGN PROSTATIC HYPERPLASIA WITH LOWER URINARY TRACT SYMPMS: ICD-10-CM

## 2024-10-25 DIAGNOSIS — I25.10 ATHEROSCLEROTIC HEART DISEASE OF NATIVE CORONARY ARTERY W/OUT ANGINA PECTORIS: ICD-10-CM

## 2024-10-25 DIAGNOSIS — N18.30 CHRONIC KIDNEY DISEASE, STAGE 3 UNSPECIFIED: ICD-10-CM

## 2024-10-25 DIAGNOSIS — B37.42 CANDIDAL BALANITIS: ICD-10-CM

## 2024-10-25 DIAGNOSIS — H61.23 IMPACTED CERUMEN, BILATERAL: ICD-10-CM

## 2024-10-25 DIAGNOSIS — Z87.898 PERSONAL HISTORY OF OTHER SPECIFIED CONDITIONS: ICD-10-CM

## 2024-10-25 DIAGNOSIS — I50.9 HEART FAILURE, UNSPECIFIED: ICD-10-CM

## 2024-10-25 DIAGNOSIS — Z92.29 PERSONAL HISTORY OF OTHER DRUG THERAPY: ICD-10-CM

## 2024-10-25 DIAGNOSIS — J32.9 CHRONIC SINUSITIS, UNSPECIFIED: ICD-10-CM

## 2024-10-25 PROCEDURE — 99495 TRANSJ CARE MGMT MOD F2F 14D: CPT

## 2024-10-25 RX ORDER — ISOSORBIDE MONONITRATE 120 MG/1
120 TABLET, EXTENDED RELEASE ORAL
Refills: 0 | Status: ACTIVE | COMMUNITY

## 2024-10-25 RX ORDER — FAMOTIDINE 20 MG/1
20 TABLET, FILM COATED ORAL
Refills: 0 | Status: ACTIVE | COMMUNITY

## 2024-10-25 RX ORDER — CLOPIDOGREL 75 MG/1
75 TABLET, FILM COATED ORAL
Refills: 0 | Status: ACTIVE | COMMUNITY

## 2024-10-27 DIAGNOSIS — I13.0 HYPERTENSIVE HEART AND CHRONIC KIDNEY DISEASE WITH HEART FAILURE AND STAGE 1 THROUGH STAGE 4 CHRONIC KIDNEY DISEASE, OR UNSPECIFIED CHRONIC KIDNEY DISEASE: ICD-10-CM

## 2024-10-27 DIAGNOSIS — D69.6 THROMBOCYTOPENIA, UNSPECIFIED: ICD-10-CM

## 2024-10-27 DIAGNOSIS — I50.33 ACUTE ON CHRONIC DIASTOLIC (CONGESTIVE) HEART FAILURE: ICD-10-CM

## 2024-10-27 DIAGNOSIS — R79.89 OTHER SPECIFIED ABNORMAL FINDINGS OF BLOOD CHEMISTRY: ICD-10-CM

## 2024-10-27 DIAGNOSIS — I25.10 ATHEROSCLEROTIC HEART DISEASE OF NATIVE CORONARY ARTERY WITHOUT ANGINA PECTORIS: ICD-10-CM

## 2024-10-27 DIAGNOSIS — N17.9 ACUTE KIDNEY FAILURE, UNSPECIFIED: ICD-10-CM

## 2024-10-27 DIAGNOSIS — N18.9 CHRONIC KIDNEY DISEASE, UNSPECIFIED: ICD-10-CM

## 2024-10-27 DIAGNOSIS — N40.0 BENIGN PROSTATIC HYPERPLASIA WITHOUT LOWER URINARY TRACT SYMPTOMS: ICD-10-CM

## 2024-10-27 DIAGNOSIS — J81.1 CHRONIC PULMONARY EDEMA: ICD-10-CM

## 2024-10-27 DIAGNOSIS — I25.2 OLD MYOCARDIAL INFARCTION: ICD-10-CM

## 2024-10-27 DIAGNOSIS — R41.0 DISORIENTATION, UNSPECIFIED: ICD-10-CM

## 2024-10-27 DIAGNOSIS — E78.5 HYPERLIPIDEMIA, UNSPECIFIED: ICD-10-CM

## 2024-10-27 DIAGNOSIS — Z95.5 PRESENCE OF CORONARY ANGIOPLASTY IMPLANT AND GRAFT: ICD-10-CM

## 2024-10-27 DIAGNOSIS — Z79.82 LONG TERM (CURRENT) USE OF ASPIRIN: ICD-10-CM

## 2024-10-27 DIAGNOSIS — E11.22 TYPE 2 DIABETES MELLITUS WITH DIABETIC CHRONIC KIDNEY DISEASE: ICD-10-CM

## 2024-10-27 DIAGNOSIS — E87.70 FLUID OVERLOAD, UNSPECIFIED: ICD-10-CM

## 2024-10-27 DIAGNOSIS — M10.9 GOUT, UNSPECIFIED: ICD-10-CM

## 2024-10-27 DIAGNOSIS — Z87.442 PERSONAL HISTORY OF URINARY CALCULI: ICD-10-CM

## 2024-10-27 DIAGNOSIS — Z79.02 LONG TERM (CURRENT) USE OF ANTITHROMBOTICS/ANTIPLATELETS: ICD-10-CM

## 2024-11-12 ENCOUNTER — TRANSCRIPTION ENCOUNTER (OUTPATIENT)
Age: 89
End: 2024-11-12

## 2024-11-19 ENCOUNTER — TRANSCRIPTION ENCOUNTER (OUTPATIENT)
Age: 89
End: 2024-11-19

## 2024-11-26 PROCEDURE — 83880 ASSAY OF NATRIURETIC PEPTIDE: CPT

## 2024-11-26 PROCEDURE — 81001 URINALYSIS AUTO W/SCOPE: CPT

## 2024-11-26 PROCEDURE — 87637 SARSCOV2&INF A&B&RSV AMP PRB: CPT

## 2024-11-26 PROCEDURE — 84484 ASSAY OF TROPONIN QUANT: CPT

## 2024-11-26 PROCEDURE — 80053 COMPREHEN METABOLIC PANEL: CPT

## 2024-11-26 PROCEDURE — 83735 ASSAY OF MAGNESIUM: CPT

## 2024-11-26 PROCEDURE — 85025 COMPLETE CBC W/AUTO DIFF WBC: CPT

## 2024-11-26 PROCEDURE — 82330 ASSAY OF CALCIUM: CPT

## 2024-11-26 PROCEDURE — 83036 HEMOGLOBIN GLYCOSYLATED A1C: CPT

## 2024-11-26 PROCEDURE — 85027 COMPLETE CBC AUTOMATED: CPT

## 2024-11-26 PROCEDURE — 36415 COLL VENOUS BLD VENIPUNCTURE: CPT

## 2024-11-26 PROCEDURE — 99285 EMERGENCY DEPT VISIT HI MDM: CPT | Mod: 25

## 2024-11-26 PROCEDURE — 80048 BASIC METABOLIC PNL TOTAL CA: CPT

## 2024-11-26 PROCEDURE — 71045 X-RAY EXAM CHEST 1 VIEW: CPT

## 2024-11-26 PROCEDURE — 80061 LIPID PANEL: CPT

## 2024-11-26 PROCEDURE — 70450 CT HEAD/BRAIN W/O DYE: CPT | Mod: MC

## 2024-11-26 PROCEDURE — 82962 GLUCOSE BLOOD TEST: CPT

## 2024-11-26 PROCEDURE — 93005 ELECTROCARDIOGRAM TRACING: CPT

## 2024-11-26 PROCEDURE — 94640 AIRWAY INHALATION TREATMENT: CPT

## 2024-11-26 PROCEDURE — 85730 THROMBOPLASTIN TIME PARTIAL: CPT

## 2024-11-26 PROCEDURE — 93306 TTE W/DOPPLER COMPLETE: CPT

## 2024-11-26 PROCEDURE — 82550 ASSAY OF CK (CPK): CPT

## 2024-11-26 PROCEDURE — 84443 ASSAY THYROID STIM HORMONE: CPT

## 2024-11-26 PROCEDURE — 85610 PROTHROMBIN TIME: CPT

## 2024-11-26 PROCEDURE — 82803 BLOOD GASES ANY COMBINATION: CPT

## 2024-11-26 PROCEDURE — 84295 ASSAY OF SERUM SODIUM: CPT

## 2024-11-26 PROCEDURE — 84132 ASSAY OF SERUM POTASSIUM: CPT

## 2024-11-26 PROCEDURE — 82553 CREATINE MB FRACTION: CPT

## 2024-12-24 PROBLEM — F10.90 ALCOHOL USE: Status: ACTIVE | Noted: 2017-09-01

## 2025-01-02 ENCOUNTER — NON-APPOINTMENT (OUTPATIENT)
Age: 89
End: 2025-01-02

## 2025-01-10 ENCOUNTER — APPOINTMENT (OUTPATIENT)
Dept: NEPHROLOGY | Facility: CLINIC | Age: 89
End: 2025-01-10
Payer: MEDICARE

## 2025-01-10 VITALS
DIASTOLIC BLOOD PRESSURE: 58 MMHG | WEIGHT: 147 LBS | SYSTOLIC BLOOD PRESSURE: 124 MMHG | HEART RATE: 62 BPM | BODY MASS INDEX: 23.73 KG/M2

## 2025-01-10 DIAGNOSIS — N18.30 CHRONIC KIDNEY DISEASE, STAGE 3 UNSPECIFIED: ICD-10-CM

## 2025-01-10 DIAGNOSIS — R80.9 PROTEINURIA, UNSPECIFIED: ICD-10-CM

## 2025-01-10 DIAGNOSIS — I10 ESSENTIAL (PRIMARY) HYPERTENSION: ICD-10-CM

## 2025-01-10 DIAGNOSIS — I50.9 HEART FAILURE, UNSPECIFIED: ICD-10-CM

## 2025-01-10 DIAGNOSIS — E74.39 OTHER DISORDERS OF INTESTINAL CARBOHYDRATE ABSORPTION: ICD-10-CM

## 2025-01-10 PROCEDURE — G2211 COMPLEX E/M VISIT ADD ON: CPT

## 2025-01-10 PROCEDURE — 99214 OFFICE O/P EST MOD 30 MIN: CPT

## 2025-01-10 RX ORDER — FINASTERIDE 5 MG/1
5 TABLET, FILM COATED ORAL
Refills: 0 | Status: ACTIVE | COMMUNITY

## 2025-07-02 ENCOUNTER — RX RENEWAL (OUTPATIENT)
Age: 89
End: 2025-07-02

## 2025-07-16 PROBLEM — R26.89 POOR BALANCE: Status: ACTIVE | Noted: 2025-07-16

## 2025-07-17 PROBLEM — M54.30 SCIATIC PAIN: Status: ACTIVE | Noted: 2025-07-17
